# Patient Record
Sex: FEMALE | Race: WHITE | NOT HISPANIC OR LATINO
[De-identification: names, ages, dates, MRNs, and addresses within clinical notes are randomized per-mention and may not be internally consistent; named-entity substitution may affect disease eponyms.]

---

## 2020-09-15 PROBLEM — Z00.00 ENCOUNTER FOR PREVENTIVE HEALTH EXAMINATION: Status: ACTIVE | Noted: 2020-09-15

## 2020-09-16 ENCOUNTER — ASOB RESULT (OUTPATIENT)
Age: 25
End: 2020-09-16

## 2020-09-16 ENCOUNTER — APPOINTMENT (OUTPATIENT)
Dept: ANTEPARTUM | Facility: CLINIC | Age: 25
End: 2020-09-16
Payer: COMMERCIAL

## 2020-09-16 PROCEDURE — 76817 TRANSVAGINAL US OBSTETRIC: CPT | Mod: 59

## 2020-09-16 PROCEDURE — 76811 OB US DETAILED SNGL FETUS: CPT

## 2021-01-29 ENCOUNTER — OUTPATIENT (OUTPATIENT)
Dept: OUTPATIENT SERVICES | Facility: HOSPITAL | Age: 26
LOS: 1 days | End: 2021-01-29
Payer: COMMERCIAL

## 2021-01-29 DIAGNOSIS — Z11.52 ENCOUNTER FOR SCREENING FOR COVID-19: ICD-10-CM

## 2021-01-29 LAB — SARS-COV-2 RNA SPEC QL NAA+PROBE: SIGNIFICANT CHANGE UP

## 2021-01-29 PROCEDURE — U0005: CPT

## 2021-01-29 PROCEDURE — U0003: CPT

## 2021-01-29 PROCEDURE — C9803: CPT

## 2021-01-31 ENCOUNTER — TRANSCRIPTION ENCOUNTER (OUTPATIENT)
Age: 26
End: 2021-01-31

## 2021-01-31 ENCOUNTER — INPATIENT (INPATIENT)
Facility: HOSPITAL | Age: 26
LOS: 3 days | Discharge: HOME | End: 2021-02-04
Attending: OBSTETRICS & GYNECOLOGY | Admitting: OBSTETRICS & GYNECOLOGY
Payer: COMMERCIAL

## 2021-01-31 VITALS — HEART RATE: 87 BPM | OXYGEN SATURATION: 99 % | TEMPERATURE: 99 F

## 2021-01-31 DIAGNOSIS — Z33.1 PREGNANT STATE, INCIDENTAL: ICD-10-CM

## 2021-01-31 LAB
BASOPHILS # BLD AUTO: 0.02 K/UL — SIGNIFICANT CHANGE UP (ref 0–0.2)
BASOPHILS NFR BLD AUTO: 0.2 % — SIGNIFICANT CHANGE UP (ref 0–2)
BLD GP AB SCN SERPL QL: NEGATIVE — SIGNIFICANT CHANGE UP
EOSINOPHIL # BLD AUTO: 0.12 K/UL — SIGNIFICANT CHANGE UP (ref 0–0.5)
EOSINOPHIL NFR BLD AUTO: 1.2 % — SIGNIFICANT CHANGE UP (ref 0–6)
HCT VFR BLD CALC: 40.4 % — SIGNIFICANT CHANGE UP (ref 34.5–45)
HGB BLD-MCNC: 13.6 G/DL — SIGNIFICANT CHANGE UP (ref 11.5–15.5)
IMM GRANULOCYTES NFR BLD AUTO: 0.7 % — SIGNIFICANT CHANGE UP (ref 0–1.5)
LYMPHOCYTES # BLD AUTO: 1.96 K/UL — SIGNIFICANT CHANGE UP (ref 1–3.3)
LYMPHOCYTES # BLD AUTO: 19.4 % — SIGNIFICANT CHANGE UP (ref 13–44)
MCHC RBC-ENTMCNC: 29.2 PG — SIGNIFICANT CHANGE UP (ref 27–34)
MCHC RBC-ENTMCNC: 33.7 GM/DL — SIGNIFICANT CHANGE UP (ref 32–36)
MCV RBC AUTO: 86.7 FL — SIGNIFICANT CHANGE UP (ref 80–100)
MONOCYTES # BLD AUTO: 1.22 K/UL — HIGH (ref 0–0.9)
MONOCYTES NFR BLD AUTO: 12.1 % — SIGNIFICANT CHANGE UP (ref 2–14)
NEUTROPHILS # BLD AUTO: 6.72 K/UL — SIGNIFICANT CHANGE UP (ref 1.8–7.4)
NEUTROPHILS NFR BLD AUTO: 66.4 % — SIGNIFICANT CHANGE UP (ref 43–77)
NRBC # BLD: 0 /100 WBCS — SIGNIFICANT CHANGE UP (ref 0–0)
PLATELET # BLD AUTO: 140 K/UL — LOW (ref 150–400)
RBC # BLD: 4.66 M/UL — SIGNIFICANT CHANGE UP (ref 3.8–5.2)
RBC # FLD: 14.4 % — SIGNIFICANT CHANGE UP (ref 10.3–14.5)
RH IG SCN BLD-IMP: POSITIVE — SIGNIFICANT CHANGE UP
RH IG SCN BLD-IMP: POSITIVE — SIGNIFICANT CHANGE UP
WBC # BLD: 10.11 K/UL — SIGNIFICANT CHANGE UP (ref 3.8–10.5)
WBC # FLD AUTO: 10.11 K/UL — SIGNIFICANT CHANGE UP (ref 3.8–10.5)

## 2021-01-31 RX ORDER — SODIUM CHLORIDE 9 MG/ML
1000 INJECTION, SOLUTION INTRAVENOUS
Refills: 0 | Status: DISCONTINUED | OUTPATIENT
Start: 2021-01-31 | End: 2021-02-02

## 2021-01-31 RX ORDER — CITRIC ACID/SODIUM CITRATE 300-500 MG
15 SOLUTION, ORAL ORAL EVERY 6 HOURS
Refills: 0 | Status: DISCONTINUED | OUTPATIENT
Start: 2021-01-31 | End: 2021-02-02

## 2021-01-31 RX ORDER — OXYTOCIN 10 UNIT/ML
333.33 VIAL (ML) INJECTION
Qty: 20 | Refills: 0 | Status: DISCONTINUED | OUTPATIENT
Start: 2021-01-31 | End: 2021-02-04

## 2021-01-31 RX ADMIN — SODIUM CHLORIDE 125 MILLILITER(S): 9 INJECTION, SOLUTION INTRAVENOUS at 19:20

## 2021-01-31 RX ADMIN — SODIUM CHLORIDE 125 MILLILITER(S): 9 INJECTION, SOLUTION INTRAVENOUS at 19:24

## 2021-01-31 NOTE — OB PROVIDER H&P - HISTORY OF PRESENT ILLNESS
R1 H&P    Pt is a 24 y/o  at 40w admitted for scheduled elective IOL.   Patient denies contractions, vaginal bleeding, LOF, FM felt.   Prenatal course uncomplicated  GBS negative  EFW    OBHx: G1 current  GynHx: denies  PMHx: denies  PSHx:denies  Med: denies  All: NKDA  SH: denies t/a/d  PsychHx: denies hx of anxiety or depression    VS:   Vital Signs Last 24 Hrs  T(C): 37 (2021 18:55), Max: 37.0 (2021 18:43)  T(F): 98.6 (2021 18:55), Max: 98.6 (2021 18:43)  HR: 72 (2021 19:08) (72 - 96)  BP: 119/75 (2021 18:55) (119/75 - 119/75)  BP(mean): --  RR: 18 (2021 18:55) (18 - 18)  SpO2: 97% (2021 19:08) (97% - 99%)  GA: NAD  Cards: RRR  Pulm: CTAB  EFH: baseline 145 ,moderate variability, -accels, -decels   [place]: flat  VE: 2.5/60/-2  TAUS: vertex

## 2021-01-31 NOTE — OB RN PATIENT PROFILE - HBSAG: DATE, OB PROFILE
Anesthesia Type: 1% lidocaine with epinephrine and a 1:10 solution of 8.4% sodium bicarbonate 11-Jun-2020

## 2021-01-31 NOTE — OB RN PATIENT PROFILE - INTERNATIONAL TRAVEL
[FreeTextEntry1] : Mr. 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 Jeanette Navarro. No

## 2021-01-31 NOTE — OB PROVIDER H&P - ASSESSMENT
A&P:     24 y/o  at 40w admitted for eIOL.     Labor: admit for scheduled eIOL  -routine labs  -EFM/toco  -NPO, IV hydration  -induction with PO cytotec    Fetal: cat 1 tracing, fetal status reassuring  GBS: neg      plan per Dr. Leo Barba  PGY-1

## 2021-01-31 NOTE — OB RN PATIENT PROFILE - ALERT: PERTINENT HISTORY
Fetal Sonogram/1st Trimester Sonogram/20 Week Level II Sonogram/Ultra Screen at 12 Weeks Fetal Sonogram/1st Trimester Sonogram/20 Week Level II Sonogram/Fetal Non-Stress Test (NST)/Ultra Screen at 12 Weeks

## 2021-02-01 LAB
SARS-COV-2 IGG SERPL QL IA: NEGATIVE — SIGNIFICANT CHANGE UP
SARS-COV-2 IGM SERPL IA-ACNC: <0.1 INDEX — SIGNIFICANT CHANGE UP

## 2021-02-01 RX ORDER — OXYTOCIN 10 UNIT/ML
4 VIAL (ML) INJECTION
Qty: 30 | Refills: 0 | Status: DISCONTINUED | OUTPATIENT
Start: 2021-02-01 | End: 2021-02-04

## 2021-02-01 RX ORDER — NALOXONE HYDROCHLORIDE 4 MG/.1ML
0.1 SPRAY NASAL
Refills: 0 | Status: DISCONTINUED | OUTPATIENT
Start: 2021-02-01 | End: 2021-02-04

## 2021-02-01 RX ORDER — OXYCODONE HYDROCHLORIDE 5 MG/1
5 TABLET ORAL ONCE
Refills: 0 | Status: DISCONTINUED | OUTPATIENT
Start: 2021-02-01 | End: 2021-02-01

## 2021-02-01 RX ORDER — OXYCODONE HYDROCHLORIDE 5 MG/1
5 TABLET ORAL
Refills: 0 | Status: COMPLETED | OUTPATIENT
Start: 2021-02-02 | End: 2021-02-09

## 2021-02-01 RX ORDER — DEXAMETHASONE 0.5 MG/5ML
4 ELIXIR ORAL EVERY 6 HOURS
Refills: 0 | Status: DISCONTINUED | OUTPATIENT
Start: 2021-02-01 | End: 2021-02-02

## 2021-02-01 RX ORDER — OXYTOCIN 10 UNIT/ML
333.33 VIAL (ML) INJECTION
Qty: 20 | Refills: 0 | Status: DISCONTINUED | OUTPATIENT
Start: 2021-02-02 | End: 2021-02-04

## 2021-02-01 RX ORDER — MAGNESIUM HYDROXIDE 400 MG/1
30 TABLET, CHEWABLE ORAL
Refills: 0 | Status: DISCONTINUED | OUTPATIENT
Start: 2021-02-02 | End: 2021-02-04

## 2021-02-01 RX ORDER — SODIUM CHLORIDE 9 MG/ML
1000 INJECTION, SOLUTION INTRAVENOUS
Refills: 0 | Status: DISCONTINUED | OUTPATIENT
Start: 2021-02-02 | End: 2021-02-04

## 2021-02-01 RX ORDER — MORPHINE SULFATE 50 MG/1
2 CAPSULE, EXTENDED RELEASE ORAL ONCE
Refills: 0 | Status: DISCONTINUED | OUTPATIENT
Start: 2021-02-01 | End: 2021-02-02

## 2021-02-01 RX ORDER — ACETAMINOPHEN 500 MG
975 TABLET ORAL
Refills: 0 | Status: DISCONTINUED | OUTPATIENT
Start: 2021-02-02 | End: 2021-02-04

## 2021-02-01 RX ORDER — KETOROLAC TROMETHAMINE 30 MG/ML
30 SYRINGE (ML) INJECTION EVERY 6 HOURS
Refills: 0 | Status: DISCONTINUED | OUTPATIENT
Start: 2021-02-02 | End: 2021-02-02

## 2021-02-01 RX ORDER — DIPHENHYDRAMINE HCL 50 MG
25 CAPSULE ORAL EVERY 6 HOURS
Refills: 0 | Status: COMPLETED | OUTPATIENT
Start: 2021-02-02 | End: 2022-01-01

## 2021-02-01 RX ORDER — NALOXONE HYDROCHLORIDE 4 MG/.1ML
0.1 SPRAY NASAL
Refills: 0 | Status: DISCONTINUED | OUTPATIENT
Start: 2021-02-01 | End: 2021-02-02

## 2021-02-01 RX ORDER — HEPARIN SODIUM 5000 [USP'U]/ML
5000 INJECTION INTRAVENOUS; SUBCUTANEOUS EVERY 12 HOURS
Refills: 0 | Status: DISCONTINUED | OUTPATIENT
Start: 2021-02-02 | End: 2021-02-04

## 2021-02-01 RX ORDER — ONDANSETRON 8 MG/1
4 TABLET, FILM COATED ORAL EVERY 6 HOURS
Refills: 0 | Status: DISCONTINUED | OUTPATIENT
Start: 2021-02-01 | End: 2021-02-04

## 2021-02-01 RX ORDER — METOCLOPRAMIDE HCL 10 MG
10 TABLET ORAL ONCE
Refills: 0 | Status: DISCONTINUED | OUTPATIENT
Start: 2021-02-01 | End: 2021-02-04

## 2021-02-01 RX ORDER — ONDANSETRON 8 MG/1
4 TABLET, FILM COATED ORAL EVERY 6 HOURS
Refills: 0 | Status: DISCONTINUED | OUTPATIENT
Start: 2021-02-01 | End: 2021-02-02

## 2021-02-01 RX ORDER — FAMOTIDINE 10 MG/ML
20 INJECTION INTRAVENOUS ONCE
Refills: 0 | Status: DISCONTINUED | OUTPATIENT
Start: 2021-02-01 | End: 2021-02-04

## 2021-02-01 RX ORDER — CITRIC ACID/SODIUM CITRATE 300-500 MG
15 SOLUTION, ORAL ORAL ONCE
Refills: 0 | Status: DISCONTINUED | OUTPATIENT
Start: 2021-02-01 | End: 2021-02-04

## 2021-02-01 RX ORDER — SIMETHICONE 80 MG/1
80 TABLET, CHEWABLE ORAL EVERY 4 HOURS
Refills: 0 | Status: DISCONTINUED | OUTPATIENT
Start: 2021-02-02 | End: 2021-02-04

## 2021-02-01 RX ORDER — OXYCODONE HYDROCHLORIDE 5 MG/1
5 TABLET ORAL ONCE
Refills: 0 | Status: DISCONTINUED | OUTPATIENT
Start: 2021-02-02 | End: 2021-02-04

## 2021-02-01 RX ORDER — IBUPROFEN 200 MG
600 TABLET ORAL EVERY 6 HOURS
Refills: 0 | Status: COMPLETED | OUTPATIENT
Start: 2021-02-02 | End: 2022-01-01

## 2021-02-01 RX ORDER — TETANUS TOXOID, REDUCED DIPHTHERIA TOXOID AND ACELLULAR PERTUSSIS VACCINE, ADSORBED 5; 2.5; 8; 8; 2.5 [IU]/.5ML; [IU]/.5ML; UG/.5ML; UG/.5ML; UG/.5ML
0.5 SUSPENSION INTRAMUSCULAR ONCE
Refills: 0 | Status: DISCONTINUED | OUTPATIENT
Start: 2021-02-02 | End: 2021-02-04

## 2021-02-01 RX ORDER — LANOLIN
1 OINTMENT (GRAM) TOPICAL EVERY 6 HOURS
Refills: 0 | Status: DISCONTINUED | OUTPATIENT
Start: 2021-02-02 | End: 2021-02-04

## 2021-02-01 RX ORDER — DEXAMETHASONE 0.5 MG/5ML
4 ELIXIR ORAL EVERY 6 HOURS
Refills: 0 | Status: DISCONTINUED | OUTPATIENT
Start: 2021-02-01 | End: 2021-02-04

## 2021-02-01 RX ADMIN — OXYCODONE HYDROCHLORIDE 5 MILLIGRAM(S): 5 TABLET ORAL at 22:40

## 2021-02-01 RX ADMIN — SODIUM CHLORIDE 125 MILLILITER(S): 9 INJECTION, SOLUTION INTRAVENOUS at 01:18

## 2021-02-01 RX ADMIN — SODIUM CHLORIDE 125 MILLILITER(S): 9 INJECTION, SOLUTION INTRAVENOUS at 03:45

## 2021-02-01 RX ADMIN — Medication 4 MILLIUNIT(S)/MIN: at 04:17

## 2021-02-01 NOTE — OB PROVIDER LABOR PROGRESS NOTE - NS_ADDCERVICALEXAM_OBGYN_ALL_OB
Click to add…

## 2021-02-01 NOTE — OB RN DELIVERY SUMMARY - NS_TIMERUPTUREDADMITTED_OBGYN_ALL_OB_FT
Infant discharged with parents in car seat. Discharge instructions discussed and given to parents, state understanding. Hugs removed and paperwork signed. 18 Hour(s) 54 Minute(s)

## 2021-02-01 NOTE — OB RN DELIVERY SUMMARY - NS_SEPSISRSKCALC_OBGYN_ALL_OB_FT
No temperature has been documented for this patient in CPN or on the OB Flowsheet. Ensure the highest temperature during labor was documented on the OB Flowsheet.  No gestational age at birth has been documented. Ensure delivery date/time has been entered above.  Rupture of membranes must be entered above.   EOS calculated successfully. EOS Risk Factor: 0.5/1000 live births ([facility] national incidence); GA=40w1d; Temp=99.32; ROM=18.9; GBS='Negative'; Antibiotics='No antibiotics or any antibiotics < 2 hrs prior to birth'

## 2021-02-01 NOTE — OB PROVIDER DELIVERY SUMMARY - NSPROVIDERDELIVERYNOTE_OBGYN_ALL_OB_FT
Patient admitted for elective induction.  Taken for pLTCS for arrest of descent. Live male  delivery via lower uterine incision. Apgars 9/9. Weight= 4056g, cephalic presentation. Cord clamped and cut. Cord gas sent. Placenta delivered intact. Hysterotomy w/ b/l lateral extensions. Closed with caprosyn w/ second overlapping closure w/ caprosyn.  Interseed placed over uterus.  Rectus reapproximated with 2-0 vicryl. Fascia closed vicryl. Skin closed with monocryl. EBL 1000 IVF 2000 mL, UOP 200cc.

## 2021-02-01 NOTE — OB PROVIDER LABOR PROGRESS NOTE - NS_SUBJECTIVE/OBJECTIVE_OBGYN_ALL_OB_FT
OB PA Progress Note    Pt seen and evaluated for progress. CB in place. Exam unchanged at 3/70/-3.    For pitocin augmentation

## 2021-02-01 NOTE — OB PROVIDER LABOR PROGRESS NOTE - NSVAGINALEXAM_OBGYN_ALL_OB_DT
01-Feb-2021 01:11
01-Feb-2021 09:11
01-Feb-2021 18:24
01-Feb-2021 05:31
01-Feb-2021 14:43
01-Feb-2021 04:18
01-Feb-2021 11:22
01-Feb-2021 12:57

## 2021-02-01 NOTE — OB NEONATOLOGY/PEDIATRICIAN DELIVERY SUMMARY - NSPEDSNEONOTESA_OBGYN_ALL_OB_FT
Called to delivery of baby boy born at 40.1 wks via CS for arrest of descent to a 24y/o  O+ blood type mother. No significant maternal or prenatal history. PNL nr/immune/-, GBS - on . ROM at 905 with clear fluids. Baby emerged vigorous, crying, was w/d/s/s with APGARS of 9/9. Mom would like to breastfeed, consents Hep B and declines circ. EOS 0.26. Mother COVID status negative. In house pediatrician Dr. Clements.

## 2021-02-02 LAB
BASOPHILS # BLD AUTO: 0 K/UL — SIGNIFICANT CHANGE UP (ref 0–0.2)
BASOPHILS NFR BLD AUTO: 0 % — SIGNIFICANT CHANGE UP (ref 0–2)
EOSINOPHIL # BLD AUTO: 0 K/UL — SIGNIFICANT CHANGE UP (ref 0–0.5)
EOSINOPHIL NFR BLD AUTO: 0 % — SIGNIFICANT CHANGE UP (ref 0–6)
HCT VFR BLD CALC: 27.1 % — LOW (ref 34.5–45)
HGB BLD-MCNC: 9.1 G/DL — LOW (ref 11.5–15.5)
LYMPHOCYTES # BLD AUTO: 1.12 K/UL — SIGNIFICANT CHANGE UP (ref 1–3.3)
LYMPHOCYTES # BLD AUTO: 4.3 % — LOW (ref 13–44)
MCHC RBC-ENTMCNC: 29.9 PG — SIGNIFICANT CHANGE UP (ref 27–34)
MCHC RBC-ENTMCNC: 33.6 GM/DL — SIGNIFICANT CHANGE UP (ref 32–36)
MCV RBC AUTO: 89.1 FL — SIGNIFICANT CHANGE UP (ref 80–100)
MONOCYTES # BLD AUTO: 0.91 K/UL — HIGH (ref 0–0.9)
MONOCYTES NFR BLD AUTO: 3.5 % — SIGNIFICANT CHANGE UP (ref 2–14)
NEUTROPHILS # BLD AUTO: 23.93 K/UL — HIGH (ref 1.8–7.4)
NEUTROPHILS NFR BLD AUTO: 88.7 % — HIGH (ref 43–77)
PLATELET # BLD AUTO: 128 K/UL — LOW (ref 150–400)
RBC # BLD: 3.04 M/UL — LOW (ref 3.8–5.2)
RBC # FLD: 14.4 % — SIGNIFICANT CHANGE UP (ref 10.3–14.5)
T PALLIDUM AB TITR SER: NEGATIVE — SIGNIFICANT CHANGE UP
WBC # BLD: 25.95 K/UL — HIGH (ref 3.8–10.5)
WBC # FLD AUTO: 25.95 K/UL — HIGH (ref 3.8–10.5)

## 2021-02-02 RX ORDER — IBUPROFEN 200 MG
600 TABLET ORAL EVERY 6 HOURS
Refills: 0 | Status: DISCONTINUED | OUTPATIENT
Start: 2021-02-02 | End: 2021-02-04

## 2021-02-02 RX ORDER — OXYCODONE HYDROCHLORIDE 5 MG/1
5 TABLET ORAL
Refills: 0 | Status: DISCONTINUED | OUTPATIENT
Start: 2021-02-02 | End: 2021-02-04

## 2021-02-02 RX ORDER — FERROUS SULFATE 325(65) MG
325 TABLET ORAL THREE TIMES A DAY
Refills: 0 | Status: DISCONTINUED | OUTPATIENT
Start: 2021-02-02 | End: 2021-02-04

## 2021-02-02 RX ORDER — ASCORBIC ACID 60 MG
500 TABLET,CHEWABLE ORAL THREE TIMES A DAY
Refills: 0 | Status: DISCONTINUED | OUTPATIENT
Start: 2021-02-02 | End: 2021-02-04

## 2021-02-02 RX ORDER — DIPHENHYDRAMINE HCL 50 MG
25 CAPSULE ORAL EVERY 6 HOURS
Refills: 0 | Status: DISCONTINUED | OUTPATIENT
Start: 2021-02-02 | End: 2021-02-04

## 2021-02-02 RX ORDER — OXYCODONE HYDROCHLORIDE 5 MG/1
5 TABLET ORAL ONCE
Refills: 0 | Status: DISCONTINUED | OUTPATIENT
Start: 2021-02-02 | End: 2021-02-02

## 2021-02-02 RX ADMIN — HEPARIN SODIUM 5000 UNIT(S): 5000 INJECTION INTRAVENOUS; SUBCUTANEOUS at 05:57

## 2021-02-02 RX ADMIN — Medication 30 MILLIGRAM(S): at 01:56

## 2021-02-02 RX ADMIN — Medication 975 MILLIGRAM(S): at 15:10

## 2021-02-02 RX ADMIN — HEPARIN SODIUM 5000 UNIT(S): 5000 INJECTION INTRAVENOUS; SUBCUTANEOUS at 18:02

## 2021-02-02 RX ADMIN — Medication 975 MILLIGRAM(S): at 21:11

## 2021-02-02 RX ADMIN — OXYCODONE HYDROCHLORIDE 5 MILLIGRAM(S): 5 TABLET ORAL at 22:39

## 2021-02-02 RX ADMIN — Medication 30 MILLIGRAM(S): at 05:57

## 2021-02-02 RX ADMIN — Medication 30 MILLIGRAM(S): at 18:02

## 2021-02-02 RX ADMIN — Medication 25 MILLIGRAM(S): at 04:09

## 2021-02-02 RX ADMIN — Medication 500 MILLIGRAM(S): at 15:09

## 2021-02-02 RX ADMIN — Medication 30 MILLIGRAM(S): at 12:08

## 2021-02-02 RX ADMIN — Medication 325 MILLIGRAM(S): at 15:09

## 2021-02-02 RX ADMIN — Medication 975 MILLIGRAM(S): at 03:14

## 2021-02-02 RX ADMIN — Medication 325 MILLIGRAM(S): at 21:10

## 2021-02-02 RX ADMIN — OXYCODONE HYDROCHLORIDE 5 MILLIGRAM(S): 5 TABLET ORAL at 07:48

## 2021-02-02 RX ADMIN — SIMETHICONE 80 MILLIGRAM(S): 80 TABLET, CHEWABLE ORAL at 21:11

## 2021-02-02 RX ADMIN — Medication 975 MILLIGRAM(S): at 08:37

## 2021-02-02 RX ADMIN — Medication 500 MILLIGRAM(S): at 21:10

## 2021-02-02 NOTE — PROGRESS NOTE ADULT - SUBJECTIVE AND OBJECTIVE BOX
Day 1 of Anesthesia Pain Management Service    SUBJECTIVE:  Pain Scale Score:          [X] Refer to charted pain scores    THERAPY: Received PF epidural  morphine as above    OBJECTIVE:    Sedation:        	[X] Alert	[ ] Drowsy	[ ] Arousable      [ ] Asleep       [ ] Unresponsive    Side Effects:	[X] None	[ ] Nausea	[ ] Vomiting         [ ] Pruritus  		[ ] Weakness            [ ] Numbness	          [ ] Other:    ASSESSMENT/ PLAN  [X] Patient transitioned to prn analgesics  [X] Pain management per primary service, pain service to sign off   [X]Documentation and Verification of current medications

## 2021-02-02 NOTE — CHART NOTE - NSCHARTNOTEFT_GEN_A_CORE
PA NOTE     POD#1         Vital Signs Last 24 Hrs  T(C): 36.6 (2021 05:53), Max: 37.4 (2021 15:01)  T(F): 97.9 (2021 05:53), Max: 99.32 (2021 15:01)  HR: 86 (2021 23:52) (54 - 99)  BP: 118/71 (2021 23:52) (106/55 - 139/62)  BP(mean): 85 (2021 22:10) (73 - 89)  RR: 18 (2021 05:53) (16 - 26)  SpO2: 98% (2021 05:53) (92% - 100%)               9.1    25.95 )-----------( 128      (  @ 06:00 )             27.1                13.6   10.11 )-----------( 140      (  @ 19:45 )             40.4           Assessment: Anemia secondary to acute blood loss due to delivery    Plan:  - Ferrous Sulfate, Vitamin C supplementation.  - Monitor for signs/symptoms of anemia.   - Does not require transfusion at this time

## 2021-02-02 NOTE — PROGRESS NOTE ADULT - SUBJECTIVE AND OBJECTIVE BOX
Day 1 of Anesthesia Pain Management Service    SUBJECTIVE: Doing ok  Pain Scale Score:          [X] Refer to charted pain scores    THERAPY:  s/p   2  mg PF epidural morphine on 2\1\2021      MEDICATIONS  (STANDING):  acetaminophen   Tablet .. 975 milliGRAM(s) Oral <User Schedule>  ascorbic acid 500 milliGRAM(s) Oral three times a day  citric acid/sodium citrate Solution 15 milliLiter(s) Oral once  diphtheria/tetanus/pertussis (acellular) Vaccine (ADAcel) 0.5 milliLiter(s) IntraMuscular once  famotidine Injectable 20 milliGRAM(s) IV Push once  ferrous    sulfate 325 milliGRAM(s) Oral three times a day  heparin   Injectable 5000 Unit(s) SubCutaneous every 12 hours  ibuprofen  Tablet. 600 milliGRAM(s) Oral every 6 hours  ketorolac   Injectable 30 milliGRAM(s) IV Push every 6 hours  lactated ringers. 1000 milliLiter(s) (125 mL/Hr) IV Continuous <Continuous>  metoclopramide Injectable 10 milliGRAM(s) IV Push once  morphine PF Epidural 2 milliGRAM(s) Epidural once  oxytocin Infusion 333.333 milliUNIT(s)/Min (1000 mL/Hr) IV Continuous <Continuous>  oxytocin Infusion 4 milliUNIT(s)/Min (4 mL/Hr) IV Continuous <Continuous>  oxytocin Infusion 333.333 milliUNIT(s)/Min (1000 mL/Hr) IV Continuous <Continuous>    MEDICATIONS  (PRN):  dexAMETHasone  Injectable 4 milliGRAM(s) IV Push every 6 hours PRN Nausea  dexAMETHasone  Injectable 4 milliGRAM(s) IV Push every 6 hours PRN Nausea  diphenhydrAMINE 25 milliGRAM(s) Oral every 6 hours PRN Pruritus  lanolin Ointment 1 Application(s) Topical every 6 hours PRN Sore Nipples  magnesium hydroxide Suspension 30 milliLiter(s) Oral two times a day PRN Constipation  naloxone Injectable 0.1 milliGRAM(s) IV Push every 3 minutes PRN For ANY of the following changes in patient status:  A. Breaths Per Minute LESS THAN 10, B. Oxygen saturation LESS THAN 90%, C. Sedation score of 6 for Stop After: 4 Times  naloxone Injectable 0.1 milliGRAM(s) IV Push every 3 minutes PRN For ANY of the following changes in patient status:  A. Breaths Per Minute LESS THAN 10, B. Oxygen saturation LESS THAN 90%, C. Sedation score of 6 for Stop After: 4 Times  ondansetron Injectable 4 milliGRAM(s) IV Push every 6 hours PRN Nausea  ondansetron Injectable 4 milliGRAM(s) IV Push every 6 hours PRN Nausea  oxyCODONE    IR 5 milliGRAM(s) Oral every 3 hours PRN Moderate to Severe Pain (4-10)  oxyCODONE    IR 5 milliGRAM(s) Oral once PRN Moderate to Severe Pain (4-10)  simethicone 80 milliGRAM(s) Chew every 4 hours PRN Gas      OBJECTIVE:    Sedation:        	[X] Alert	 [ ] Drowsy	[ ] Arousable      [ ] Asleep       [ ] Unresponsive    Side Effects:	[ ] None 	[ ] Nausea	[ ] Vomiting         [ X] Pruritus: benadryl prn  		[ ] Weakness            [ ] Numbness	          [ ] Other:    Vital Signs Last 24 Hrs  T(C): 36.6 (02 Feb 2021 05:53), Max: 37.4 (01 Feb 2021 15:01)  T(F): 97.9 (02 Feb 2021 05:53), Max: 99.32 (01 Feb 2021 15:01)  HR: 86 (01 Feb 2021 23:52) (56 - 99)  BP: 118/71 (01 Feb 2021 23:52) (106/55 - 139/62)  BP(mean): 85 (01 Feb 2021 22:10) (73 - 89)  RR: 18 (02 Feb 2021 05:53) (16 - 26)  SpO2: 98% (02 Feb 2021 05:53) (92% - 100%)    ASSESSMENT/ PLAN  [X] Patient transitioned to prn analgesics  [X] Pain management per primary service, pain service to sign off   [X]Documentation and Verification of current medications

## 2021-02-02 NOTE — PROGRESS NOTE ADULT - SUBJECTIVE AND OBJECTIVE BOX
Postpartum Note-  Section POD#1    Allergies    No Known Allergies    Intolerances            S:Patient is a  25y G [id number]    POD#1 S/P  primary    C/Sec  Patient w/o complaints, pain is controlled.  Pt is OOB, tolerating PO, passing flatus. Lochia WNL.     O:  Vital Signs Last 24 Hrs  T(C): 36.6 (2021 05:53), Max: 37.4 (2021 15:01)  T(F): 97.9 (2021 05:53), Max: 99.32 (2021 15:01)  HR: 86 (2021 23:52) (54 - 99)  BP: 118/71 (2021 23:52) (106/55 - 139/62)  BP(mean): 85 (2021 22:10) (73 - 89)  RR: 18 (2021 05:53) (16 - 26)  SpO2: 98% (2021 05:53) (92% - 100%)  I&O's Summary    2021 07:01  -  2021 07:00  --------------------------------------------------------  IN: 2000 mL / OUT: 1100 mL / NET: 900 mL    2021 07:01  -  2021 06:56  --------------------------------------------------------  IN: 1000 mL / OUT: 2900 mL / NET: -1900 mL        Gen: NAD  Abdomen: Soft, appropriately tender, non-distended, fundus firm.  Incision: Clean, dry, and intact.  Negative erythema/edema/ecchymosis   Sub Q-dermabond  Lochia WNL  Ext: neg edema. Negative Homans B/L    LABS:                          13.6   10.11 )-----------( 140      ( 2021 19:45 )             40.4

## 2021-02-03 LAB
BASOPHILS # BLD AUTO: 0.03 K/UL — SIGNIFICANT CHANGE UP (ref 0–0.2)
BASOPHILS NFR BLD AUTO: 0.2 % — SIGNIFICANT CHANGE UP (ref 0–2)
EOSINOPHIL # BLD AUTO: 0.22 K/UL — SIGNIFICANT CHANGE UP (ref 0–0.5)
EOSINOPHIL NFR BLD AUTO: 1.5 % — SIGNIFICANT CHANGE UP (ref 0–6)
HCT VFR BLD CALC: 23.9 % — LOW (ref 34.5–45)
HGB BLD-MCNC: 7.8 G/DL — LOW (ref 11.5–15.5)
IMM GRANULOCYTES NFR BLD AUTO: 0.9 % — SIGNIFICANT CHANGE UP (ref 0–1.5)
LYMPHOCYTES # BLD AUTO: 1.91 K/UL — SIGNIFICANT CHANGE UP (ref 1–3.3)
LYMPHOCYTES # BLD AUTO: 13 % — SIGNIFICANT CHANGE UP (ref 13–44)
MCHC RBC-ENTMCNC: 29.2 PG — SIGNIFICANT CHANGE UP (ref 27–34)
MCHC RBC-ENTMCNC: 32.6 GM/DL — SIGNIFICANT CHANGE UP (ref 32–36)
MCV RBC AUTO: 89.5 FL — SIGNIFICANT CHANGE UP (ref 80–100)
MONOCYTES # BLD AUTO: 1.12 K/UL — HIGH (ref 0–0.9)
MONOCYTES NFR BLD AUTO: 7.6 % — SIGNIFICANT CHANGE UP (ref 2–14)
NEUTROPHILS # BLD AUTO: 11.27 K/UL — HIGH (ref 1.8–7.4)
NEUTROPHILS NFR BLD AUTO: 76.8 % — SIGNIFICANT CHANGE UP (ref 43–77)
NRBC # BLD: 0 /100 WBCS — SIGNIFICANT CHANGE UP (ref 0–0)
PLATELET # BLD AUTO: 124 K/UL — LOW (ref 150–400)
RBC # BLD: 2.67 M/UL — LOW (ref 3.8–5.2)
RBC # FLD: 14.8 % — HIGH (ref 10.3–14.5)
WBC # BLD: 14.68 K/UL — HIGH (ref 3.8–10.5)
WBC # FLD AUTO: 14.68 K/UL — HIGH (ref 3.8–10.5)

## 2021-02-03 RX ADMIN — Medication 500 MILLIGRAM(S): at 21:15

## 2021-02-03 RX ADMIN — Medication 975 MILLIGRAM(S): at 09:28

## 2021-02-03 RX ADMIN — Medication 600 MILLIGRAM(S): at 00:25

## 2021-02-03 RX ADMIN — Medication 500 MILLIGRAM(S): at 15:19

## 2021-02-03 RX ADMIN — HEPARIN SODIUM 5000 UNIT(S): 5000 INJECTION INTRAVENOUS; SUBCUTANEOUS at 05:30

## 2021-02-03 RX ADMIN — Medication 975 MILLIGRAM(S): at 21:16

## 2021-02-03 RX ADMIN — Medication 600 MILLIGRAM(S): at 23:36

## 2021-02-03 RX ADMIN — HEPARIN SODIUM 5000 UNIT(S): 5000 INJECTION INTRAVENOUS; SUBCUTANEOUS at 18:10

## 2021-02-03 RX ADMIN — SIMETHICONE 80 MILLIGRAM(S): 80 TABLET, CHEWABLE ORAL at 12:46

## 2021-02-03 RX ADMIN — Medication 325 MILLIGRAM(S): at 05:30

## 2021-02-03 RX ADMIN — Medication 600 MILLIGRAM(S): at 18:10

## 2021-02-03 RX ADMIN — Medication 500 MILLIGRAM(S): at 05:31

## 2021-02-03 RX ADMIN — Medication 600 MILLIGRAM(S): at 12:42

## 2021-02-03 RX ADMIN — Medication 325 MILLIGRAM(S): at 21:15

## 2021-02-03 RX ADMIN — Medication 600 MILLIGRAM(S): at 05:31

## 2021-02-03 RX ADMIN — Medication 325 MILLIGRAM(S): at 15:19

## 2021-02-03 RX ADMIN — Medication 975 MILLIGRAM(S): at 15:19

## 2021-02-03 RX ADMIN — SIMETHICONE 80 MILLIGRAM(S): 80 TABLET, CHEWABLE ORAL at 08:00

## 2021-02-03 RX ADMIN — Medication 975 MILLIGRAM(S): at 03:24

## 2021-02-03 RX ADMIN — SIMETHICONE 80 MILLIGRAM(S): 80 TABLET, CHEWABLE ORAL at 21:15

## 2021-02-03 NOTE — PROGRESS NOTE ADULT - SUBJECTIVE AND OBJECTIVE BOX
Postpartum Note-  Section POD#2      Rubella IgG:     Immune                 RPR:               Negative        Blood Type:     O+    S:Patient is a  25y G  1  P 1   POD#2 S/P C/Sec  Subjective: Patient w/o complaints, pain is controlled.  Pt is OOB, tolerating PO, passing flatus, and voiding. Lochia WNL.   Feeding: Breastfeeding    O:  Vital Signs Last 24 Hrs  T(C): 36.5 (2021 05:00), Max: 36.7 (2021 17:00)  T(F): 97.7 (2021 05:00), Max: 98.1 (2021 17:00)  HR: 90 (2021 05:00) (70 - 92)  BP: 116/77 (2021 05:00) (103/65 - 116/77)  BP(mean): --  RR: 18 (2021 05:00) (16 - 18)  SpO2: 99% (2021 05:00) (96% - 99%)      Gen: NAD  CV: rrr s1s2, CTABL  Abdomen: Soft, nontender, non distended, fundus firm.  Bowel Sounds x 4 quadrants  Incision: Clean, dry, and intact.  Negative erythema/edema/ecchymosis.   SubQ  Lochia WNL  Ext: Neg edema, Neg calf tenderness.  Pedal pulses palpated B/L    LABS:                          7.8    14.68 )-----------( 124      ( 2021 06:12 )             23.9       A/P:  25y  POD # 2 S/P   primary  section for failure to progress, doing well    PMHx: none  Current Issues: none    Increase OOB  PO Pain Protocol  Continue Regular Diet  Continue Routine Postop/Postpartum Care           Postpartum Note-  Section POD#2      Rubella IgG:     Immune                 RPR:               Negative        Blood Type:     O+    S:Patient is a  25y G  1  P 1   POD#2 S/P C/Sec  Subjective: Patient w/o complaints, pain is controlled.  Pt is OOB, tolerating PO, passing flatus, and voiding. Lochia WNL.   Feeding: Breastfeeding    O:  Vital Signs Last 24 Hrs  T(C): 36.5 (2021 05:00), Max: 36.7 (2021 17:00)  T(F): 97.7 (2021 05:00), Max: 98.1 (2021 17:00)  HR: 90 (2021 05:00) (70 - 92)  BP: 116/77 (2021 05:00) (103/65 - 116/77)  BP(mean): --  RR: 18 (2021 05:00) (16 - 18)  SpO2: 99% (2021 05:00) (96% - 99%)      Gen: NAD  CV: rrr s1s2, CTABL  Abdomen: Soft, nontender, non distended, fundus firm.  Bowel Sounds x 4 quadrants  Incision: Clean, dry, and intact.  Negative erythema/edema/ecchymosis.   SubQ  Lochia WNL  Ext: Neg edema, Neg calf tenderness.  Pedal pulses palpated B/L    LABS:                          7.8    14.68 )-----------( 124      ( 2021 06:12 )             23.9       A/P:  25y  POD # 2 S/P   primary  section for failure to descend, doing well    PMHx: none  Current Issues: anemia secondary from acute blood loss during surgery, EBL: 1000, VSS, patient is assympomatic    Increase OOB  PO Pain Protocol  Continue Regular Diet  Continue Routine Postop/Postpartum Care

## 2021-02-03 NOTE — PROGRESS NOTE ADULT - ASSESSMENT
A/P:  25y  POD # 1 S/P   primary   section, doing well    PAST MEDICAL & SURGICAL HISTORY:  No pertinent past medical history    No significant past surgical history      Current Issues:    
25y  POD # 2 S/P   primary  section for failure to progress, doing well

## 2021-02-04 ENCOUNTER — TRANSCRIPTION ENCOUNTER (OUTPATIENT)
Age: 26
End: 2021-02-04

## 2021-02-04 VITALS
HEART RATE: 71 BPM | DIASTOLIC BLOOD PRESSURE: 81 MMHG | TEMPERATURE: 98 F | RESPIRATION RATE: 18 BRPM | SYSTOLIC BLOOD PRESSURE: 111 MMHG

## 2021-02-04 PROCEDURE — 86901 BLOOD TYPING SEROLOGIC RH(D): CPT

## 2021-02-04 PROCEDURE — 59025 FETAL NON-STRESS TEST: CPT

## 2021-02-04 PROCEDURE — C1765: CPT

## 2021-02-04 PROCEDURE — 86769 SARS-COV-2 COVID-19 ANTIBODY: CPT

## 2021-02-04 PROCEDURE — 85025 COMPLETE CBC W/AUTO DIFF WBC: CPT

## 2021-02-04 PROCEDURE — 86900 BLOOD TYPING SEROLOGIC ABO: CPT

## 2021-02-04 PROCEDURE — 59050 FETAL MONITOR W/REPORT: CPT

## 2021-02-04 PROCEDURE — 86780 TREPONEMA PALLIDUM: CPT

## 2021-02-04 PROCEDURE — 86850 RBC ANTIBODY SCREEN: CPT

## 2021-02-04 RX ADMIN — HEPARIN SODIUM 5000 UNIT(S): 5000 INJECTION INTRAVENOUS; SUBCUTANEOUS at 05:57

## 2021-02-04 RX ADMIN — Medication 975 MILLIGRAM(S): at 02:16

## 2021-02-04 RX ADMIN — SIMETHICONE 80 MILLIGRAM(S): 80 TABLET, CHEWABLE ORAL at 05:57

## 2021-02-04 RX ADMIN — Medication 600 MILLIGRAM(S): at 05:58

## 2021-02-04 RX ADMIN — Medication 325 MILLIGRAM(S): at 05:57

## 2021-02-04 RX ADMIN — Medication 975 MILLIGRAM(S): at 08:03

## 2021-02-04 RX ADMIN — Medication 500 MILLIGRAM(S): at 05:58

## 2021-02-04 NOTE — PROGRESS NOTE ADULT - SUBJECTIVE AND OBJECTIVE BOX
Postpartum Note-  Section POD#3    Allergies  No Known Allergies    Intolerances  Denies    Rubella: Immune                   RPR: Neg                        Blood Type: O+    S:Patient is a  25y  POD#3 S/P C/Sec  Subjective: Patient w/o complaints, pain is controlled.  Pt is OOB, tolerating PO, passing flatus. Lochia WNL.   Feeding: Breast feeding    O:  Vital Signs Last 24 Hrs  T(C): 36.6 (2021 05:11), Max: 36.6 (2021 09:34)  T(F): 97.8 (2021 05:11), Max: 97.9 (2021 17:10)  HR: 71 (2021 05:11) (71 - 90)  BP: 111/81 (2021 05:11) (111/81 - 116/76)  BP(mean): --  RR: 18 (2021 05:11) (17 - 18)  SpO2: 98% (2021 17:10) (98% - 98%)     Gen: NAD  CV: rrr s1s2, CTABL  Abdomen: Soft, nontender, non-distended, fundus firm.  Bowel Sounds x 4 quadrants  Incision: Clean, dry, and intact.  Negative erythema/edema/ecchymosis   Sub Q  Lochia WNL  Ext:  Neg Edema, Neg Calf tenderness. Pedal pulses palpated B/L    LABS:                          7.8    14.68 )-----------( 124      ( 2021 06:12 )             23.9       A/P:  25y  POD # 3 S/P primary   section, doing well    PMHx: None  Current Issues: Anemia    Increase OOB  Regular diet  PO Pain Protocol  Routine Postop/Postpartum care  Discharge Planning    Leilani Cavazos PA-C

## 2021-02-04 NOTE — DISCHARGE NOTE OB - CARE PROVIDER_API CALL
Pranav Barnes)  Obstetrics and Gynecology  [address] [place] [address], [address]  [place] [address]  Phone: [phone]  Fax: [phone]  Follow Up Time:

## 2021-02-04 NOTE — PROGRESS NOTE ADULT - ATTENDING COMMENTS
agree with above note  discharge  tana bolden md
agree with above note  cont present mgmt  t kimi marin

## 2021-02-04 NOTE — DISCHARGE NOTE OB - PATIENT PORTAL LINK FT
You can access the FollowMyHealth Patient Portal offered by [facility] by registering at the following website: [email or web address]. By joining [facility]’s FollowMyHealth portal, you will also be able to view your health information using other applications (apps) compatible with our system.

## 2021-05-29 NOTE — OB RN INTRAOPERATIVE NOTE - NS_SCRUBTECH_OBGYN_ALL_OB_FT
Elocon ointment for itchy rash twice a day for up to 2 weeks. Follow with all over vaseline/aquaphor. Ok for baths but pat dry and then immediately apply ointments as directed. Use fragrance free soaps and detergents. If no improvement in 1 month return for recheck.    Use butt paste for diaper rash    Use vaseline as needed to vulvar area to help with irritation. Can use warm bath soaks 1-2 times a day.     Follow up with kidney ultrasound when able.          young 1.8

## 2021-06-02 ENCOUNTER — APPOINTMENT (OUTPATIENT)
Dept: OTOLARYNGOLOGY | Facility: CLINIC | Age: 26
End: 2021-06-02
Payer: COMMERCIAL

## 2021-06-02 VITALS
HEIGHT: 61 IN | DIASTOLIC BLOOD PRESSURE: 73 MMHG | TEMPERATURE: 97.3 F | OXYGEN SATURATION: 98 % | SYSTOLIC BLOOD PRESSURE: 110 MMHG | WEIGHT: 145 LBS | HEART RATE: 66 BPM | BODY MASS INDEX: 27.38 KG/M2

## 2021-06-02 DIAGNOSIS — Z78.9 OTHER SPECIFIED HEALTH STATUS: ICD-10-CM

## 2021-06-02 DIAGNOSIS — D18.01 HEMANGIOMA OF SKIN AND SUBCUTANEOUS TISSUE: ICD-10-CM

## 2021-06-02 DIAGNOSIS — Z82.49 FAMILY HISTORY OF ISCHEMIC HEART DISEASE AND OTHER DISEASES OF THE CIRCULATORY SYSTEM: ICD-10-CM

## 2021-06-02 DIAGNOSIS — Z83.438 FAMILY HISTORY OF OTHER DISORDER OF LIPOPROTEIN METABOLISM AND OTHER LIPIDEMIA: ICD-10-CM

## 2021-06-02 DIAGNOSIS — Z84.0 FAMILY HISTORY OF DISEASES OF THE SKIN AND SUBCUTANEOUS TISSUE: ICD-10-CM

## 2021-06-02 PROCEDURE — 99203 OFFICE O/P NEW LOW 30 MIN: CPT

## 2021-06-02 PROCEDURE — 99072 ADDL SUPL MATRL&STAF TM PHE: CPT

## 2021-06-03 PROBLEM — Z82.49 FAMILY HISTORY OF HYPERTENSION: Status: ACTIVE | Noted: 2021-06-03

## 2021-06-03 PROBLEM — Z83.438 FAMILY HISTORY OF HYPERLIPIDEMIA: Status: ACTIVE | Noted: 2021-06-03

## 2021-06-03 PROBLEM — Z78.9 NO PERTINENT PAST MEDICAL HISTORY: Status: RESOLVED | Noted: 2021-06-02 | Resolved: 2021-06-03

## 2021-06-03 PROBLEM — Z84.0 FAMILY HISTORY OF PSORIASIS: Status: ACTIVE | Noted: 2021-06-03

## 2021-06-03 RX ORDER — NORETHINDRONE ACETATE AND ETHINYL ESTRADIOL, AND FERROUS FUMARATE 1MG-20(24)
1-20 KIT ORAL
Qty: 84 | Refills: 0 | Status: ACTIVE | COMMUNITY
Start: 2021-05-11

## 2021-06-03 NOTE — PHYSICAL EXAM
[Normal] : orientation to person, place, and time: normal [de-identified] : right soft lump that protrudes when pt. is biting down, over the right mandible angle. no apparent skin/mucosal involvement.

## 2021-06-03 NOTE — ASSESSMENT
[FreeTextEntry1] : 26 yo F, p/w a known right face lump over the angle of the mandible, she reports previous [facility] workup diagnosed it as an hemangioma.\par We thoroughly discussed the diagnosis with the patient and explained the required workup, f/u and treatment options.\par \par Plan\par - obtain [facility] records. \par - facial MRI. \par - referred to Opal Lozano/JUDY.\par - RTC sooner should any worrisome symptoms develop.\par - Pt verbalized understanding of above\par

## 2021-06-03 NOTE — HISTORY OF PRESENT ILLNESS
[de-identified] : 24yo F, previously seen by Dr. Turner in [facility] in her childhood for right mandible angle lump- m/p Hemangioma. \par Pt. reports she was never treated for this finding. She has not followed anyone else or done any recent imaging.  She recently gave birth, states it bothers her cosmetically. The lump is prominent when she bites down. She returns to see if there are any new treatments.\par \par She denies breathing/swallowing problems. she denies bleeding, she denies palpitations. \par Pt. requested all her medical hx from [facility].

## 2021-06-03 NOTE — REASON FOR VISIT
[Initial Evaluation] : an initial evaluation for [Family Member] : family member [FreeTextEntry2] : right face lump

## 2021-06-07 PROBLEM — Z78.9 OTHER SPECIFIED HEALTH STATUS: Chronic | Status: ACTIVE | Noted: 2021-01-31

## 2021-06-18 NOTE — DISCHARGE NOTE OB - CAREGIVER NAME
Patient Instructions by Leslie Agrawal MD at 10/26/2020 10:00 AM     Author: Leslie Agrawal MD Service: -- Author Type: Physician    Filed: 10/26/2020 10:23 AM Encounter Date: 10/26/2020 Status: Signed    : Leslie Agrawal MD (Physician)         10/26/2020  Wt Readings from Last 1 Encounters:   01/30/20 25 lb 5 oz (11.5 kg) (83 %, Z= 0.95)*     * Growth percentiles are based on WHO (Boys, 0-2 years) data.       Acetaminophen Dosing Instructions  (May take every 4-6 hours)      WEIGHT   AGE Infant/Children's  160mg/5ml Children's   Chewable Tabs  80 mg each Baudilio Strength  Chewable Tabs  160 mg     Milliliter (ml) Soft Chew Tabs Chewable Tabs   6-11 lbs 0-3 months 1.25 ml     12-17 lbs 4-11 months 2.5 ml     18-23 lbs 12-23 months 3.75 ml     24-35 lbs 2-3 years 5 ml 2 tabs    36-47 lbs 4-5 years 7.5 ml 3 tabs    48-59 lbs 6-8 years 10 ml 4 tabs 2 tabs   60-71 lbs 9-10 years 12.5 ml 5 tabs 2.5 tabs   72-95 lbs 11 years 15 ml 6 tabs 3 tabs   96 lbs and over 12 years   4 tabs     Ibuprofen Dosing Instructions- Liquid  (May take every 6-8 hours)      WEIGHT   AGE Concentrated Drops   50 mg/1.25 ml Infant/Children's   100 mg/5ml     Dropperful Milliliter (ml)   12-17 lbs 6- 11 months 1 (1.25 ml)    18-23 lbs 12-23 months 1 1/2 (1.875 ml)    24-35 lbs 2-3 years  5 ml   36-47 lbs 4-5 years  7.5 ml   48-59 lbs 6-8 years  10 ml   60-71 lbs 9-10 years  12.5 ml   72-95 lbs 11 years  15 ml       Ibuprofen Dosing Instructions- Tablets/Caplets  (May take every 6-8 hours)    WEIGHT AGE Children's   Chewable Tabs   50 mg Baudilio Strength   Chewable Tabs   100 mg Baudilio Strength   Caplets    100 mg     Tablet Tablet Caplet   24-35 lbs 2-3 years 2 tabs     36-47 lbs 4-5 years 3 tabs     48-59 lbs 6-8 years 4 tabs 2 tabs 2 caps   60-71 lbs 9-10 years 5 tabs 2.5 tabs 2.5 caps   72-95 lbs 11 years 6 tabs 3 tabs 3 caps          Patient Education      BRIGHT FUTURES HANDOUT- PARENT  2 YEAR VISIT  Here are some  suggestions from [employer] experts that may be of value to your family.     HOW YOUR FAMILY IS DOING  Take time for yourself and your partner.  Stay in touch with friends.  Make time for family activities. Spend time with each child.  Teach your child not to hit, bite, or hurt other people. Be a role model.  If you feel unsafe in your home or have been hurt by someone, let us know. Hotlines and community resources can also provide confidential help.  Dont smoke or use e-cigarettes. Keep your home and car smoke-free. Tobacco-free spaces keep children healthy.  Dont use alcohol or drugs.  Accept help from family and friends.  If you are worried about your living or food situation, reach out for help. Community agencies and programs such as WIC and SNAP can provide information and assistance.    YOUR RUPINDER BEHAVIOR  Praise your child when he does what you ask him to do.  Listen to and respect your child. Expect others to as well.  Help your child talk about his feelings.  Watch how he responds to new people or situations.  Read, talk, sing, and explore together. These activities are the best ways to help toddlers learn.  Limit TV, tablet, or smartphone use to no more than 1 hour of high-quality programs each day.  It is better for toddlers to play than to watch TV.  Encourage your child to play for up to 60 minutes a day.  Avoid TV during meals. Talk together instead.    TALKING AND YOUR CHILD  Use clear, simple language with your child. Dont use baby talk.  Talk slowly and remember that it may take a while for your child to respond. Your child should be able to follow simple instructions.  Read to your child every day. Your child may love hearing the same story over and over.  Talk about and describe pictures in books.  Talk about the things you see and hear when you are together.  Ask your child to point to things as you read.  Stop a story to let your child make an animal sound or finish a part of the  story.    TOILET TRAINING  Begin toilet training when your child is ready. Signs of being ready for toilet training include  Staying dry for 2 hours  Knowing if she is wet or dry  Can pull pants down and up  Wanting to learn  Can tell you if she is going to have a bowel movement  Plan for toilet breaks often. Children use the toilet as many as 10 times each day.  Teach your child to wash her hands after using the toilet.  Clean potty-chairs after every use.  Take the child to choose underwear when she feels ready to do so.    SAFETY  Make sure your rupinder car safety seat is rear facing until he reaches the highest weight or height allowed by the car safety seats . Once your child reaches these limits, it is time to switch the seat to the forward- facing position.  Make sure the car safety seat is installed correctly in the back seat. The harness straps should be snug against your rupinder chest.  Children watch what you do. Everyone should wear a lap and shoulder seat belt in the car.  Never leave your child alone in your home or yard, especially near cars or machinery, without a responsible adult in charge.  When backing out of the garage or driving in the driveway, have another adult hold your child a safe distance away so he is not in the path of your car.  Have your child wear a helmet that fits properly when riding bikes and trikes.  If it is necessary to keep a gun in your home, store it unloaded and locked with the ammunition locked separately.    WHAT TO EXPECT AT YOUR RUPINDER 2  YEAR VISIT  We will talk about  Creating family routines  Supporting your talking child  Getting along with other children  Getting ready for   Keeping your child safe at home, outside, and in the car      Helpful Resources: National Domestic Violence Hotline: 185.494.8169  Poison Help Line:  228.658.2805  Information About Car Safety Seats: www.safercar.gov/parents  Toll-free Auto Safety Hotline:  223-189-6333  Consistent with Bright Futures: Guidelines for Health Supervision of Infants, Children, and Adolescents, 4th Edition  For more information, go to https://brightfutures.aap.org.                     sonal beckett

## 2021-07-13 ENCOUNTER — NON-APPOINTMENT (OUTPATIENT)
Age: 26
End: 2021-07-13

## 2021-10-05 ENCOUNTER — APPOINTMENT (OUTPATIENT)
Dept: OTOLARYNGOLOGY | Facility: CLINIC | Age: 26
End: 2021-10-05

## 2022-05-19 NOTE — OB PROVIDER LABOR PROGRESS NOTE - ASSESSMENT
attg note  cx 4/70/-2  fhr cat 1  cfb removed  epid in place  cont pit ind  t kimi marin
attg note  cx 4/80/-2  arm clear  fhr cat 1  cont induction  t kimi marin
attg note  pt has been fully dilated and either laboring down or pushing x 3 hours with no descent of vertex with ++ molding  fhr cat 1  disc with couple recommendation for c/s---fully reviewed  will proceed when room available  tana bolden md
26 y/o  eIOL  - CB placed, both balloon inflated with 60cc of NS  - continue IOL with PO cytotec if contractions space out  - transition to pitocin@4a    Plan per Dr. Cameron Barba  PGY-1
23
25

## 2023-06-09 ENCOUNTER — APPOINTMENT (OUTPATIENT)
Dept: HUMAN REPRODUCTION | Facility: CLINIC | Age: 28
End: 2023-06-09
Payer: COMMERCIAL

## 2023-06-09 PROCEDURE — 99205 OFFICE O/P NEW HI 60 MIN: CPT | Mod: 25

## 2023-06-09 PROCEDURE — 36415 COLL VENOUS BLD VENIPUNCTURE: CPT

## 2023-06-09 PROCEDURE — 76830 TRANSVAGINAL US NON-OB: CPT

## 2023-06-23 ENCOUNTER — TRANSCRIPTION ENCOUNTER (OUTPATIENT)
Age: 28
End: 2023-06-23

## 2023-07-10 ENCOUNTER — APPOINTMENT (OUTPATIENT)
Dept: OBGYN | Facility: CLINIC | Age: 28
End: 2023-07-10
Payer: COMMERCIAL

## 2023-07-10 PROCEDURE — 99214 OFFICE O/P EST MOD 30 MIN: CPT

## 2023-07-10 PROCEDURE — 36415 COLL VENOUS BLD VENIPUNCTURE: CPT

## 2023-07-10 PROCEDURE — 76830 TRANSVAGINAL US NON-OB: CPT | Mod: 59

## 2023-07-31 ENCOUNTER — APPOINTMENT (OUTPATIENT)
Dept: OBGYN | Facility: CLINIC | Age: 28
End: 2023-07-31
Payer: COMMERCIAL

## 2023-07-31 PROCEDURE — 36415 COLL VENOUS BLD VENIPUNCTURE: CPT

## 2023-07-31 PROCEDURE — 76817 TRANSVAGINAL US OBSTETRIC: CPT

## 2023-08-21 ENCOUNTER — APPOINTMENT (OUTPATIENT)
Dept: OBGYN | Facility: CLINIC | Age: 28
End: 2023-08-21
Payer: COMMERCIAL

## 2023-08-21 PROCEDURE — 0502F SUBSEQUENT PRENATAL CARE: CPT

## 2023-08-21 PROCEDURE — 36415 COLL VENOUS BLD VENIPUNCTURE: CPT

## 2023-09-18 ENCOUNTER — APPOINTMENT (OUTPATIENT)
Dept: OBGYN | Facility: CLINIC | Age: 28
End: 2023-09-18
Payer: COMMERCIAL

## 2023-09-18 PROCEDURE — 0502F SUBSEQUENT PRENATAL CARE: CPT

## 2023-09-18 PROCEDURE — 36415 COLL VENOUS BLD VENIPUNCTURE: CPT

## 2023-10-23 ENCOUNTER — APPOINTMENT (OUTPATIENT)
Dept: ANTEPARTUM | Facility: CLINIC | Age: 28
End: 2023-10-23
Payer: COMMERCIAL

## 2023-10-23 ENCOUNTER — APPOINTMENT (OUTPATIENT)
Dept: OBGYN | Facility: CLINIC | Age: 28
End: 2023-10-23
Payer: COMMERCIAL

## 2023-10-23 ENCOUNTER — ASOB RESULT (OUTPATIENT)
Age: 28
End: 2023-10-23

## 2023-10-23 PROCEDURE — 90686 IIV4 VACC NO PRSV 0.5 ML IM: CPT

## 2023-10-23 PROCEDURE — 0502F SUBSEQUENT PRENATAL CARE: CPT

## 2023-10-23 PROCEDURE — 76811 OB US DETAILED SNGL FETUS: CPT

## 2023-10-23 PROCEDURE — 90471 IMMUNIZATION ADMIN: CPT

## 2023-11-20 ENCOUNTER — APPOINTMENT (OUTPATIENT)
Dept: OBGYN | Facility: CLINIC | Age: 28
End: 2023-11-20
Payer: COMMERCIAL

## 2023-11-20 PROCEDURE — 0502F SUBSEQUENT PRENATAL CARE: CPT

## 2023-12-05 ENCOUNTER — APPOINTMENT (OUTPATIENT)
Dept: OBGYN | Facility: CLINIC | Age: 28
End: 2023-12-05
Payer: COMMERCIAL

## 2023-12-05 PROCEDURE — 36415 COLL VENOUS BLD VENIPUNCTURE: CPT

## 2023-12-05 PROCEDURE — 0502F SUBSEQUENT PRENATAL CARE: CPT

## 2024-01-05 ENCOUNTER — APPOINTMENT (OUTPATIENT)
Dept: OBGYN | Facility: CLINIC | Age: 29
End: 2024-01-05
Payer: COMMERCIAL

## 2024-01-05 PROCEDURE — 76819 FETAL BIOPHYS PROFIL W/O NST: CPT | Mod: 59

## 2024-01-05 PROCEDURE — 90471 IMMUNIZATION ADMIN: CPT

## 2024-01-05 PROCEDURE — 90715 TDAP VACCINE 7 YRS/> IM: CPT

## 2024-01-05 PROCEDURE — 0502F SUBSEQUENT PRENATAL CARE: CPT

## 2024-01-05 PROCEDURE — 76816 OB US FOLLOW-UP PER FETUS: CPT

## 2024-01-18 ENCOUNTER — NON-APPOINTMENT (OUTPATIENT)
Age: 29
End: 2024-01-18

## 2024-01-18 ENCOUNTER — APPOINTMENT (OUTPATIENT)
Dept: OTOLARYNGOLOGY | Facility: CLINIC | Age: 29
End: 2024-01-18
Payer: COMMERCIAL

## 2024-01-18 VITALS — BODY MASS INDEX: 31.15 KG/M2 | HEIGHT: 61 IN | WEIGHT: 165 LBS

## 2024-01-18 DIAGNOSIS — K21.9 GASTRO-ESOPHAGEAL REFLUX DISEASE W/OUT ESOPHAGITIS: ICD-10-CM

## 2024-01-18 DIAGNOSIS — H92.02 OTALGIA, LEFT EAR: ICD-10-CM

## 2024-01-18 DIAGNOSIS — R09.81 NASAL CONGESTION: ICD-10-CM

## 2024-01-18 DIAGNOSIS — R07.0 PAIN IN THROAT: ICD-10-CM

## 2024-01-18 DIAGNOSIS — M26.609 UNSPECIFIED TEMPOROMANDIBULAR JOINT DISORDER: ICD-10-CM

## 2024-01-18 DIAGNOSIS — H61.22 IMPACTED CERUMEN, LEFT EAR: ICD-10-CM

## 2024-01-18 PROCEDURE — 31231 NASAL ENDOSCOPY DX: CPT

## 2024-01-18 PROCEDURE — 69210 REMOVE IMPACTED EAR WAX UNI: CPT

## 2024-01-18 PROCEDURE — 99214 OFFICE O/P EST MOD 30 MIN: CPT | Mod: 25

## 2024-01-18 NOTE — END OF VISIT
[FreeTextEntry3] : I personally saw and examined LIU LARSON in detail. I spoke to JOSE CARLOS Nichole regarding the assessment and plan of care. I reviewed the above assessment and plan of care, and agree. I have made changes in changes in the body of the note where appropriate.I personally reviewed the HPI, PMH, FH, SH, ROS and medications/allergies. I have spoken to JOSE CARLOS Nichole regarding the history and have personally determined the assessment and plan of care, and documented this myself. I was present and participated in all key portions of the encounter and all procedures noted above. I have made changes in the body of the note where appropriate.   Attesting Faculty: See Attending Signature Below

## 2024-01-18 NOTE — HISTORY OF PRESENT ILLNESS
[de-identified] : 29 y/o F, 34 weeks pregnant, with 2 weeks of throat and ear discomfort, feels the ear pain is worse.  Pos GERD, had taken Pepcid and tums.  No trouble with eating or drinking.  Symptoms are worse at night and in the morning.  No change in hearing. No vertigo.  Now also 2 days age developed URI with nasal congestion and PND.  Using nasal saline.  constant clicking when swallowing x 2 weeks

## 2024-01-18 NOTE — REASON FOR VISIT
[Subsequent Evaluation] : a subsequent evaluation for [FreeTextEntry2] : Loss of voice and throat discomfort.

## 2024-01-18 NOTE — ASSESSMENT
[FreeTextEntry1] : Left otalgia, likely due to Left TMJ. or possibly Cerumen and hair on left TM: - Cerumen and hair removed from left ear - - discussed etiology of TMJ and why can cause referred ear pain - advised to use warm compresses, soft diet, no chewing gum - recommend referral to dentist or oral surgery for TMJ eval/treatment if symptoms persist - Avoid NSAID's during pregnancy.   URI with nasal congestion and mild raspy voice x 2 days, Likely Viral: - Limited treatment due to pregnancy  - Start hypertonic saline - Can do steam treatments.  - Hesitant to give Flonase during pregnancy, however, If interested will need to clear it with OB-GYN

## 2024-01-18 NOTE — PHYSICAL EXAM
[Midline] : trachea located in midline position [Normal] : no rashes [de-identified] : Large misalignment during opening and closing of the mouth on left.  [de-identified] : Cerumen and hair on the left.  Right clear.  [Nasal Endoscopy Performed] : nasal endoscopy was performed, see procedure section for findings

## 2024-01-18 NOTE — PROCEDURE
[FreeTextEntry3] : Procedure - Cerumen Removal.  After informed verbal consent is obtained, cerumen is removed from the left ear canal with a alligator and suction.  Normal appearing canal following removal. [FreeTextEntry6] : Flexible scope #44 was used. Right nasal passage with normal inferior, middle and superior turbinates. Nasal passage patent with clear middle meatus and sphenoethmoid recess. Left nasal passage with normal inferior, middle and superior turbinates. Nasal passage was patent with clear middle meatus and sphenoethmoid recess. No mucopurulence or polyps appreciated. Nasopharynx clear.

## 2024-01-22 ENCOUNTER — APPOINTMENT (OUTPATIENT)
Dept: OBGYN | Facility: CLINIC | Age: 29
End: 2024-01-22

## 2024-01-23 ENCOUNTER — APPOINTMENT (OUTPATIENT)
Dept: OBGYN | Facility: CLINIC | Age: 29
End: 2024-01-23
Payer: COMMERCIAL

## 2024-01-23 PROCEDURE — 0502F SUBSEQUENT PRENATAL CARE: CPT

## 2024-02-08 ENCOUNTER — APPOINTMENT (OUTPATIENT)
Dept: OBGYN | Facility: CLINIC | Age: 29
End: 2024-02-08
Payer: COMMERCIAL

## 2024-02-08 PROCEDURE — 0502F SUBSEQUENT PRENATAL CARE: CPT

## 2024-02-15 ENCOUNTER — APPOINTMENT (OUTPATIENT)
Dept: OBGYN | Facility: CLINIC | Age: 29
End: 2024-02-15
Payer: COMMERCIAL

## 2024-02-15 PROCEDURE — 0502F SUBSEQUENT PRENATAL CARE: CPT

## 2024-02-22 ENCOUNTER — APPOINTMENT (OUTPATIENT)
Dept: OBGYN | Facility: CLINIC | Age: 29
End: 2024-02-22
Payer: COMMERCIAL

## 2024-02-22 PROCEDURE — 76819 FETAL BIOPHYS PROFIL W/O NST: CPT | Mod: 59

## 2024-02-22 PROCEDURE — 76816 OB US FOLLOW-UP PER FETUS: CPT

## 2024-02-22 PROCEDURE — 0502F SUBSEQUENT PRENATAL CARE: CPT

## 2024-02-27 ENCOUNTER — INPATIENT (INPATIENT)
Facility: HOSPITAL | Age: 29
LOS: 2 days | Discharge: HOME | End: 2024-03-01
Attending: STUDENT IN AN ORGANIZED HEALTH CARE EDUCATION/TRAINING PROGRAM | Admitting: STUDENT IN AN ORGANIZED HEALTH CARE EDUCATION/TRAINING PROGRAM
Payer: COMMERCIAL

## 2024-02-27 VITALS — HEART RATE: 89 BPM | OXYGEN SATURATION: 100 %

## 2024-02-27 DIAGNOSIS — Z98.891 HISTORY OF UTERINE SCAR FROM PREVIOUS SURGERY: Chronic | ICD-10-CM

## 2024-02-27 LAB
BASOPHILS # BLD AUTO: 0.03 K/UL — SIGNIFICANT CHANGE UP (ref 0–0.2)
BASOPHILS NFR BLD AUTO: 0.3 % — SIGNIFICANT CHANGE UP (ref 0–2)
BLD GP AB SCN SERPL QL: NEGATIVE — SIGNIFICANT CHANGE UP
EOSINOPHIL # BLD AUTO: 0.1 K/UL — SIGNIFICANT CHANGE UP (ref 0–0.5)
EOSINOPHIL NFR BLD AUTO: 1 % — SIGNIFICANT CHANGE UP (ref 0–6)
HCT VFR BLD CALC: 38.1 % — SIGNIFICANT CHANGE UP (ref 34.5–45)
HGB BLD-MCNC: 12.7 G/DL — SIGNIFICANT CHANGE UP (ref 11.5–15.5)
IMM GRANULOCYTES NFR BLD AUTO: 0.5 % — SIGNIFICANT CHANGE UP (ref 0–0.9)
LYMPHOCYTES # BLD AUTO: 1.82 K/UL — SIGNIFICANT CHANGE UP (ref 1–3.3)
LYMPHOCYTES # BLD AUTO: 18.3 % — SIGNIFICANT CHANGE UP (ref 13–44)
MCHC RBC-ENTMCNC: 28.6 PG — SIGNIFICANT CHANGE UP (ref 27–34)
MCHC RBC-ENTMCNC: 33.3 GM/DL — SIGNIFICANT CHANGE UP (ref 32–36)
MCV RBC AUTO: 85.8 FL — SIGNIFICANT CHANGE UP (ref 80–100)
MONOCYTES # BLD AUTO: 1.15 K/UL — HIGH (ref 0–0.9)
MONOCYTES NFR BLD AUTO: 11.6 % — SIGNIFICANT CHANGE UP (ref 2–14)
NEUTROPHILS # BLD AUTO: 6.78 K/UL — SIGNIFICANT CHANGE UP (ref 1.8–7.4)
NEUTROPHILS NFR BLD AUTO: 68.3 % — SIGNIFICANT CHANGE UP (ref 43–77)
NRBC # BLD: 0 /100 WBCS — SIGNIFICANT CHANGE UP (ref 0–0)
PLATELET # BLD AUTO: 163 K/UL — SIGNIFICANT CHANGE UP (ref 150–400)
RBC # BLD: 4.44 M/UL — SIGNIFICANT CHANGE UP (ref 3.8–5.2)
RBC # FLD: 14.5 % — SIGNIFICANT CHANGE UP (ref 10.3–14.5)
RH IG SCN BLD-IMP: POSITIVE — SIGNIFICANT CHANGE UP
WBC # BLD: 9.93 K/UL — SIGNIFICANT CHANGE UP (ref 3.8–10.5)
WBC # FLD AUTO: 9.93 K/UL — SIGNIFICANT CHANGE UP (ref 3.8–10.5)

## 2024-02-27 RX ORDER — SODIUM CHLORIDE 9 MG/ML
1000 INJECTION, SOLUTION INTRAVENOUS
Refills: 0 | Status: DISCONTINUED | OUTPATIENT
Start: 2024-02-27 | End: 2024-02-28

## 2024-02-27 RX ORDER — CITRIC ACID/SODIUM CITRATE 300-500 MG
15 SOLUTION, ORAL ORAL EVERY 6 HOURS
Refills: 0 | Status: DISCONTINUED | OUTPATIENT
Start: 2024-02-27 | End: 2024-02-28

## 2024-02-27 RX ORDER — CHLORHEXIDINE GLUCONATE 213 G/1000ML
1 SOLUTION TOPICAL DAILY
Refills: 0 | Status: DISCONTINUED | OUTPATIENT
Start: 2024-02-27 | End: 2024-02-28

## 2024-02-27 RX ORDER — OXYTOCIN 10 UNIT/ML
333.33 VIAL (ML) INJECTION
Qty: 20 | Refills: 0 | Status: DISCONTINUED | OUTPATIENT
Start: 2024-02-27 | End: 2024-03-01

## 2024-02-27 RX ORDER — OXYTOCIN 10 UNIT/ML
4 VIAL (ML) INJECTION
Qty: 30 | Refills: 0 | Status: DISCONTINUED | OUTPATIENT
Start: 2024-02-27 | End: 2024-03-01

## 2024-02-27 RX ADMIN — SODIUM CHLORIDE 125 MILLILITER(S): 9 INJECTION, SOLUTION INTRAVENOUS at 22:00

## 2024-02-27 RX ADMIN — SODIUM CHLORIDE 125 MILLILITER(S): 9 INJECTION, SOLUTION INTRAVENOUS at 21:30

## 2024-02-27 NOTE — OB PROVIDER H&P - ASSESSMENT
27yo  @39 weeks and 5 days admitted for a TOLAC  Plan:  - Admit to L&D  - Routine labs, IVF, NPO  - EFM:   - GBS:  - EFW:   - Augmentaton of labor with _   - OR Expectant management  - Discussed with  _      27yo  @39 weeks and 5 days admitted for an eIOL, desiring to TOLAC.   Plan:  - Admit to L&D  - Routine labs, IVF, NPO  - EFM: Category I   - GBS: Unknown   - EFW: 3572g  - Cervical balloon   - IV Pitocin 2x2   - Anticipate    - Discussed with Dr. Knox      29yo  @39 weeks and 5 days admitted for an eIOL, desiring to TOLAC.   Plan:  - Admit to L&D  - Routine labs, IVF, NPO  - EFM: Category I   - GBS: Negative   - EFW: 3572g  - Cervical balloon   - IV Pitocin 2x2   - Anticipate    - Discussed with Dr. Knox

## 2024-02-27 NOTE — OB PROVIDER H&P - HISTORY OF PRESENT ILLNESS
29yo  @39 weeks and 5 days presents with an eIOL, desiring to TOLAC. Patient admits to good fetal movement, and denies painful contractions, vaginal bleeding, or loss of fluids at this time.   GBS:   EFW: 3572g  OB: ,  2/2 arrest of decent, full-term, boy, 8#9    GYN: Denies history of fibroids, abnormal pap smears, STDs, or ovarian cysts   Allergies: NKDA   Medical Hx: Denies history of HTN, DM, bleeding disorders, thyroid disorders   Medications: PNV   Surgical Hx:  section,    Social Hx: Denies x3, no anxiety or depression  27yo  @39 weeks and 5 days presents for an eIOL, desiring to TOLAC. Patient admits to good fetal movement, and denies painful contractions, vaginal bleeding, or loss of fluids at this time.   GBS:   EFW: 3572g  OB: ,  2/2 arrest of decent, full-term, boy, 8#9    GYN: Denies history of fibroids, abnormal pap smears, STDs, or ovarian cysts   Allergies: NKDA   Medical Hx: Denies history of HTN, DM, bleeding disorders, thyroid disorders   Medications: PNV   Surgical Hx:  section,    Social Hx: Denies x3, no anxiety or depression  27yo  @39 weeks and 5 days admitted for an eIOL, desiring to TOLAC. Patient admits to good fetal movement, and denies painful contractions, vaginal bleeding, or loss of fluids at this time.   GBS:   EFW: 3572g  OB: ,  2/2 arrest of decent, full-term, boy, 8#9    GYN: Denies history of fibroids, abnormal pap smears, STDs, or ovarian cysts   Allergies: NKDA   Medical Hx: Denies history of HTN, DM, bleeding disorders, thyroid disorders   Medications: PNV   Surgical Hx:  section,    Social Hx: Denies x3, no anxiety or depression  27yo  @39 weeks and 5 days admitted for an eIOL, desiring to TOLAC. Patient admits to good fetal movement, and denies painful contractions, vaginal bleeding, or loss of fluids at this time.   GBS: Negative   EFW: 3572g  OB: ,  2/ arrest of decent, full-term, boy, 8#9    GYN: Denies history of fibroids, abnormal pap smears, STDs, or ovarian cysts   Allergies: NKDA   Medical Hx: Denies history of HTN, DM, bleeding disorders, thyroid disorders   Medications: PNV   Surgical Hx:  section,    Social Hx: Denies x3, no anxiety or depression

## 2024-02-27 NOTE — OB PROVIDER H&P - ATTENDING COMMENTS
29yo  @39 weeks and 5 days admitted for an eIOL, desiring to TOLAC.     Admit  Labs  Consents  EFM/toco  IVH/clears  for balloon followed by sean Knox  OB attg

## 2024-02-27 NOTE — OB PROVIDER LABOR PROGRESS NOTE - NS_SUBJECTIVE/OBJECTIVE_OBGYN_ALL_OB_FT
Pt seen for placement of CB. CB placed without incident with 60cc of saline in the uterine balloon and 60cc of saline in the vaginal balloon. Pt tolerated well, comfortable with an epidural.

## 2024-02-27 NOTE — OB PROVIDER H&P - NSHPPHYSICALEXAM_GEN_ALL_CORE
CV: S1,S2  Pulmonary: Clear to auscultation bilaterally    Abdomen: Gravid abdomen  Extremities: Nontender to palpation bilaterally     EFM: 130hr, moderate variability, +accelerations, -decelerations   TOCO: Irregular contractions   SVE: 2/70/-3  BSUS: Vertex CV: S1,S2  Pulmonary: Clear to auscultation bilaterally    Abdomen: Gravid abdomen  Extremities: Nontender to palpation bilaterally     EFM: 130hr, moderate variability, +accelerations, -decelerations   TOCO: Irregular contractions   SVE: 2/ 70/ -3  BSUS: Vertex

## 2024-02-27 NOTE — OB PROVIDER LABOR PROGRESS NOTE - ASSESSMENT
27yo  @39 weeks and 5 days admitted for an eIOL, desiring to TOLAC   Plan:  -Cervical balloon in place  -Patient is comfortable with an epidural  -IV Pitocin ordered  -Anticipate

## 2024-02-27 NOTE — OB RN PATIENT PROFILE - INTERNATIONAL TRAVEL
Assessment:  1  Primary osteoarthritis of both knees  XR knee 3 vw right non injury    XR knee 3 vw left non injury      2  Baker's cyst of knee, left          Patient Active Problem List   Diagnosis   • Closed fracture distal radius and ulna, right, initial encounter   • Anxiety   • Gastroesophageal reflux disease with esophagitis   • History of pericarditis   • Heart murmur   • Primary hyperparathyroidism (HCC)   • Thyroid nodule   • Vitamin D deficiency   • Atypical chest pain   • Migraine without aura and without status migrainosus, not intractable   • Dizziness and giddiness   • Insomnia   • Cervicalgia   • Abnormal CT scan of lung   • Irritable bowel syndrome with diarrhea   • Elevated amylase and lipase   • Paresthesias   • Carpal tunnel syndrome of left wrist   • Dysphonia   • Reflux laryngitis   • Paresis of left vocal fold   • Glottic insufficiency   • Muscle tension dysphonia   • TMJ dysfunction   • Pharyngoesophageal dysphagia   • Palpitations   • Hallux abducto valgus, bilateral   • Pincer nail deformity   • Osteoporosis with current pathological fracture   • Parathyroid related hypercalcemia (HCC)   • Cervical myofascial pain syndrome   • Edema of both ankles   • Aneurysm (HCC)   • Calf pain   • Lung nodule   • Myalgia   • Pericardial effusion   • SOBOE (shortness of breath on exertion)   • Elevated blood-pressure reading without diagnosis of hypertension   • Skin tag of anus   • H/O parathyroidectomy (HCC)   • [facility]inter[facility] disease) ([facility] )           Plan      75 y o  female with bilateral knee pain  Upon review of the bilateral knee x-rays, a thorough history and my examination, Lili Martinez is presenting with signs and symptoms consistent with primary osteoarthritis and bilateral baker's cyst  Diagnosis, treatment options and associated risks were discussed with the patient including no treatment, nonsurgical treatment and potential for surgical intervention    The patient was given the opportunity to ask questions regarding each  Subjective:     Patient ID:    Chief Aguero [facility] 75 y o  female      HPI    Patient comes in today for initial evaluation bilateral knee pain, and left knee baker's cyst  Patient states she has had chronic intermittent knee pain and swelling  She notes the swelling is in the posterior left knee and bilateral medial calf region  She describes her left posterior knee pain as severe at times  She notes a change in her gait due to her knee pain  She notes she has become knocked knee  She denies any history of injury or trauma to either lower extremity  Lastly she reports she has tried lubricant and corticosteroid injections in the past, which she states she did get symptomatic relief from  The following portions of the patient's history were reviewed and updated as appropriate: allergies, current medications, past family history, past social history, past surgical history and problem list         Social History     Socioeconomic History   • Marital status: /Civil Union     Spouse name: Not on file   • Number of children: Not on file   • Years of education: Not on file   • Highest education level: Not on file   Occupational History   • Not on file   Tobacco Use   • Smoking status: Never   • Smokeless tobacco: Never   Vaping Use   • Vaping Use: Never used   Substance and Sexual Activity   • Alcohol use: Not Currently     Comment: Rarely    • Drug use: No   • Sexual activity: Yes     Partners: Male   Other Topics Concern   • Not on file   Social History Narrative    WORK:    1   ([employer]; [employer]) - concern for mold exposure    2  [employer]        HOBBIES:    1  Singing    2  Dancing    3  Hx of ceramics (+ dust)        PETS:    1    Dogs    -  Denies birds        TRAVEL:    -  [place]         EXPOSURES:    Denies mold; down pillows/comforters; hot tubs     Social Determinants of Health     Financial Resource Strain: Low Risk • Difficulty of Paying Living Expenses: Not hard at all   Food Insecurity: Not on file   Transportation Needs: No Transportation Needs   • Lack of Transportation (Medical): No   • Lack of Transportation (Non-Medical):  No   Physical Activity: Not on file   Stress: Not on file   Social Connections: Not on file   Intimate Partner Violence: Not on file   Housing Stability: Not on file     Past Medical History:   Diagnosis Date   • Anxiety    • Arthritis    • Back pain    • Balance problems    • Chest pain     heaviness   • Difficulty swallowing    • Dizziness    • Dry cough    • Gait disorder    • GERD (gastroesophageal reflux disease)    • Hyperparathyroidism (HCC)    • Hypertension    • Increased frequency of headaches    • Migraines    • Neck pain    • Numbness and tingling     bles   • Palpitations    • Pericarditis    • Thyroid disease     nodule   • Trouble in sleeping    • Wears glasses      Past Surgical History:   Procedure Laterality Date   • APPENDECTOMY     • CHOLECYSTECTOMY      laparoscopic   • COLONOSCOPY     • KNEE SURGERY Left    • PERICARDIAL WINDOW     • OK EXPLORE PARATHYROID GLANDS N/A 8/17/2022    Procedure: PARATHYROIDECTOMY, 4 GLAND EXPLORATION;  Surgeon: Bethany Abarca MD;  Location: [facility];  Service: ENT   • OK OPEN RX DISTAL RADIUS FX, EXTRA-ARTICULAR Right 3/22/2018    Procedure: OPEN REDUCTION W/ INTERNAL FIXATION (ORIF) RADIUS, splint application;  Surgeon: Gaby Kay MD;  Location: [facility];  Service: Orthopedics   • UPPER GASTROINTESTINAL ENDOSCOPY       Allergies   Allergen Reactions   • Ciprofloxacin Myalgia     Current Outpatient Medications on File Prior to Visit   Medication Sig Dispense Refill   • acetaminophen (TYLENOL) 500 mg tablet Take 500-1,000 mg by mouth every 6 (six) hours as needed for mild pain     • albuterol (PROVENTIL HFA,VENTOLIN HFA) 90 mcg/act inhaler Inhale 2 puffs every 6 (six) hours as needed for wheezing or shortness of breath 8 g 0   • ALPRAZolam (XANAX) 0 5 mg tablet Take 1 tablet (0 5 mg total) by mouth daily at bedtime as needed for anxiety or sleep 20 tablet 1   • ascorbic acid (VITAMIN C) 500 mg tablet Take 500 mg by mouth daily     • b complex vitamins tablet Take 1 tablet by mouth daily     • Cholecalciferol (Vitamin D) 50 MCG (2000 UT) CAPS Take 1 capsule (2,000 Units total) by mouth daily (Patient taking differently: Take 3,000 Units by mouth daily)     • dicyclomine (BENTYL) 10 mg capsule TAKE 1 CAPSULE BY MOUTH 4 TIMES A DAY (BEFORE MEALS AND AT BEDTIME) (Patient taking differently: Take 10 mg by mouth 4 (four) times a day as needed) 360 capsule 1   • furosemide (LASIX) 20 mg tablet Take 1 tablet (20 mg total) by mouth daily as needed (leg swelling) 10 tablet 0   • Magnesium 300 MG CAPS Take 300 mg by mouth in the morning     • metoprolol succinate (TOPROL-XL) 25 mg 24 hr tablet Take 0 5 tablets (12 5 mg total) by mouth daily at bedtime 45 tablet 2   • Multiple Vitamins-Calcium (ONE-A-DAY WOMENS PO) Take 1 tablet by mouth daily     • NON FORMULARY Take 2 tablets by mouth in the morning MK-7     • omeprazole (PriLOSEC) 20 mg delayed release capsule Take 1 capsule (20 mg total) by mouth 2 (two) times a day before meals 180 capsule 2   • Riboflavin (VITAMIN B-2 PO) Take 250 mg by mouth in the morning     • rizatriptan (MAXALT) 10 MG tablet Take 1 tablet (10 mg total) by mouth once as needed for migraine May repeat in 2 hours if needed  Max 2/24 hours, 9/month  9 tablet 6   • sucralfate (CARAFATE) 1 g/10 mL suspension Take 10 mL (1 g total) by mouth daily at bedtime 300 mL 0   • traZODone (DESYREL) 50 mg tablet Take 1 tablet (50 mg total) by mouth daily at bedtime 30 tablet 1     No current facility-administered medications on file prior to visit  Objective:    Review of Systems   Constitutional: Negative for chills and fever  HENT: Negative for ear pain and sore throat  Eyes: Negative for pain and visual disturbance  Respiratory: Negative for cough and shortness of breath  Cardiovascular: Negative for chest pain and palpitations  Gastrointestinal: Negative for abdominal pain and vomiting  Genitourinary: Negative for dysuria and hematuria  Musculoskeletal: Negative for arthralgias and back pain  Skin: Negative for color change and rash  Neurological: Negative for seizures and syncope  All other systems reviewed and are negative  Right Knee Exam     Tenderness   The patient is experiencing tenderness in the medial joint line and lateral joint line  Range of Motion   Extension: 0   Flexion: 120     Tests   Varus: negative Valgus: negative    Other   Erythema: absent  Sensation: normal  Pulse: present  Swelling: none  Effusion: no effusion present    Comments:  Varicosities around the knee and distal lower extremity  Spider veins   Palpable baker's cyst       Left Knee Exam     Tenderness   The patient is experiencing tenderness in the medial joint line and lateral joint line  Range of Motion   Extension: 0   Flexion: 120     Tests   Varus: negative Valgus: negative    Other   Erythema: absent  Sensation: normal  Pulse: present  Swelling: none  Effusion: no effusion present    Comments:  Varicosities around the knee and distal lower extremity   Spider veins   Palpable baker's cyst            Physical Exam  HENT:      Head: Normocephalic  Eyes:      Extraocular Movements: Extraocular movements intact  Pulmonary:      Effort: Pulmonary effort is normal    Musculoskeletal:         General: Normal range of motion  Right knee: No effusion  Left knee: No effusion  Skin:     General: Skin is warm and dry  Neurological:      General: No focal deficit present  Mental Status: She is alert  Psychiatric:         Behavior: Behavior normal          Large joint arthrocentesis: bilateral knee  Universal Protocol:  Consent: Verbal consent obtained    Risks and benefits: risks, benefits and alternatives were discussed  Consent given by: patient  Timeout called at: 12/5/2022 12:20 PM   Patient understanding: patient states understanding of the procedure being performed  Patient identity confirmed: verbally with patient    Supporting Documentation  Indications: pain and diagnostic evaluation   Procedure Details  Location: knee - bilateral knee  Needle size: 22 G  Ultrasound guidance: no    Medications (Right): 10 mL ropivacaine 0 5 %; 40 mg triamcinolone acetonide 40 mg/mLMedications (Left): 10 mL ropivacaine 0 5 %; 40 mg triamcinolone acetonide 40 mg/mL   Patient tolerance: patient tolerated the procedure well with no immediate complications  Dressing:  Sterile dressing applied    2 ml Naropin (ropivacaine HCL injection)   05%             I have personally reviewed pertinent films in PACS and my interpretation is bilateral knee x-rays taken today demonstrate degenerative changes with significant lateral joint space narrowing, bone spur formation, and sclerosisnoted       Scribe Attestation    I,:  Grisel Khoury am acting as a scribe while in the presence of the attending physician :       I,:  Glenis Gold, DO personally performed the services described in this documentation    as scribed in my presence :           Portions of the record may have been created with voice recognition software   Occasional wrong word or "sound a like" substitutions may have occurred due to the inherent limitations of voice recognition software   Read the chart carefully and recognize, using context, where substitutions have occurred  No

## 2024-02-27 NOTE — OB RN PATIENT PROFILE - NS_LMP_OBGYN_ALL_OB_DT
Assumed care for pt from Fais[facility] [employer]. Previous RN stated he gave report to ICU. Pt to PACU at this time. Pt resting comfortably in no distress. 25-May-2023

## 2024-02-27 NOTE — OB RN PATIENT PROFILE - FALL HARM RISK - UNIVERSAL INTERVENTIONS
Bed in lowest position, wheels locked, appropriate side rails in place/Call bell, personal items and telephone in reach/Instruct patient to call for assistance before getting out of bed or chair/Non-slip footwear when patient is out of bed/[place] to call system/Physically safe environment - no spills, clutter or unnecessary equipment/Purposeful Proactive Rounding/Room/bathroom lighting operational, light cord in reach

## 2024-02-28 LAB — T PALLIDUM AB TITR SER: NEGATIVE — SIGNIFICANT CHANGE UP

## 2024-02-28 PROCEDURE — 59400 OBSTETRICAL CARE: CPT

## 2024-02-28 RX ORDER — HYDROCORTISONE 1 %
1 OINTMENT (GRAM) TOPICAL EVERY 6 HOURS
Refills: 0 | Status: DISCONTINUED | OUTPATIENT
Start: 2024-02-28 | End: 2024-03-01

## 2024-02-28 RX ORDER — IBUPROFEN 200 MG
600 TABLET ORAL EVERY 6 HOURS
Refills: 0 | Status: DISCONTINUED | OUTPATIENT
Start: 2024-02-28 | End: 2024-03-01

## 2024-02-28 RX ORDER — MAGNESIUM HYDROXIDE 400 MG/1
30 TABLET, CHEWABLE ORAL
Refills: 0 | Status: DISCONTINUED | OUTPATIENT
Start: 2024-02-28 | End: 2024-03-01

## 2024-02-28 RX ORDER — DIPHENHYDRAMINE HCL 50 MG
25 CAPSULE ORAL EVERY 6 HOURS
Refills: 0 | Status: DISCONTINUED | OUTPATIENT
Start: 2024-02-28 | End: 2024-03-01

## 2024-02-28 RX ORDER — SIMETHICONE 80 MG/1
80 TABLET, CHEWABLE ORAL EVERY 4 HOURS
Refills: 0 | Status: DISCONTINUED | OUTPATIENT
Start: 2024-02-28 | End: 2024-03-01

## 2024-02-28 RX ORDER — ACETAMINOPHEN 500 MG
975 TABLET ORAL
Refills: 0 | Status: DISCONTINUED | OUTPATIENT
Start: 2024-02-28 | End: 2024-03-01

## 2024-02-28 RX ORDER — OXYTOCIN 10 UNIT/ML
41.67 VIAL (ML) INJECTION
Qty: 20 | Refills: 0 | Status: DISCONTINUED | OUTPATIENT
Start: 2024-02-28 | End: 2024-03-01

## 2024-02-28 RX ORDER — TETANUS TOXOID, REDUCED DIPHTHERIA TOXOID AND ACELLULAR PERTUSSIS VACCINE, ADSORBED 5; 2.5; 8; 8; 2.5 [IU]/.5ML; [IU]/.5ML; UG/.5ML; UG/.5ML; UG/.5ML
0.5 SUSPENSION INTRAMUSCULAR ONCE
Refills: 0 | Status: DISCONTINUED | OUTPATIENT
Start: 2024-02-28 | End: 2024-03-01

## 2024-02-28 RX ORDER — OXYCODONE HYDROCHLORIDE 5 MG/1
5 TABLET ORAL ONCE
Refills: 0 | Status: DISCONTINUED | OUTPATIENT
Start: 2024-02-28 | End: 2024-03-01

## 2024-02-28 RX ORDER — SODIUM CHLORIDE 9 MG/ML
3 INJECTION INTRAMUSCULAR; INTRAVENOUS; SUBCUTANEOUS EVERY 8 HOURS
Refills: 0 | Status: DISCONTINUED | OUTPATIENT
Start: 2024-02-28 | End: 2024-02-29

## 2024-02-28 RX ORDER — OXYCODONE HYDROCHLORIDE 5 MG/1
5 TABLET ORAL
Refills: 0 | Status: DISCONTINUED | OUTPATIENT
Start: 2024-02-28 | End: 2024-03-01

## 2024-02-28 RX ORDER — IBUPROFEN 200 MG
600 TABLET ORAL EVERY 6 HOURS
Refills: 0 | Status: COMPLETED | OUTPATIENT
Start: 2024-02-28 | End: 2025-01-26

## 2024-02-28 RX ORDER — LANOLIN
1 OINTMENT (GRAM) TOPICAL EVERY 6 HOURS
Refills: 0 | Status: DISCONTINUED | OUTPATIENT
Start: 2024-02-28 | End: 2024-03-01

## 2024-02-28 RX ORDER — AER TRAVELER 0.5 G/1
1 SOLUTION RECTAL; TOPICAL EVERY 4 HOURS
Refills: 0 | Status: DISCONTINUED | OUTPATIENT
Start: 2024-02-28 | End: 2024-03-01

## 2024-02-28 RX ORDER — KETOROLAC TROMETHAMINE 30 MG/ML
30 SYRINGE (ML) INJECTION ONCE
Refills: 0 | Status: DISCONTINUED | OUTPATIENT
Start: 2024-02-28 | End: 2024-02-28

## 2024-02-28 RX ORDER — DIBUCAINE 1 %
1 OINTMENT (GRAM) RECTAL EVERY 6 HOURS
Refills: 0 | Status: DISCONTINUED | OUTPATIENT
Start: 2024-02-28 | End: 2024-03-01

## 2024-02-28 RX ORDER — BENZOCAINE 10 %
1 GEL (GRAM) MUCOUS MEMBRANE EVERY 6 HOURS
Refills: 0 | Status: DISCONTINUED | OUTPATIENT
Start: 2024-02-28 | End: 2024-03-01

## 2024-02-28 RX ORDER — PRAMOXINE HYDROCHLORIDE 150 MG/15G
1 AEROSOL, FOAM RECTAL EVERY 4 HOURS
Refills: 0 | Status: DISCONTINUED | OUTPATIENT
Start: 2024-02-28 | End: 2024-03-01

## 2024-02-28 RX ADMIN — OXYCODONE HYDROCHLORIDE 5 MILLIGRAM(S): 5 TABLET ORAL at 22:00

## 2024-02-28 RX ADMIN — Medication 600 MILLIGRAM(S): at 18:20

## 2024-02-28 RX ADMIN — Medication 600 MILLIGRAM(S): at 11:17

## 2024-02-28 RX ADMIN — SODIUM CHLORIDE 3 MILLILITER(S): 9 INJECTION INTRAMUSCULAR; INTRAVENOUS; SUBCUTANEOUS at 13:00

## 2024-02-28 RX ADMIN — OXYCODONE HYDROCHLORIDE 5 MILLIGRAM(S): 5 TABLET ORAL at 18:20

## 2024-02-28 RX ADMIN — OXYCODONE HYDROCHLORIDE 5 MILLIGRAM(S): 5 TABLET ORAL at 12:15

## 2024-02-28 RX ADMIN — Medication 4 MILLIUNIT(S)/MIN: at 01:45

## 2024-02-28 RX ADMIN — OXYCODONE HYDROCHLORIDE 5 MILLIGRAM(S): 5 TABLET ORAL at 19:00

## 2024-02-28 RX ADMIN — Medication 600 MILLIGRAM(S): at 17:03

## 2024-02-28 RX ADMIN — Medication 975 MILLIGRAM(S): at 21:30

## 2024-02-28 RX ADMIN — Medication 30 MILLIGRAM(S): at 07:59

## 2024-02-28 RX ADMIN — Medication 975 MILLIGRAM(S): at 10:00

## 2024-02-28 RX ADMIN — Medication 975 MILLIGRAM(S): at 09:12

## 2024-02-28 RX ADMIN — Medication 1 TABLET(S): at 11:18

## 2024-02-28 RX ADMIN — Medication 975 MILLIGRAM(S): at 22:00

## 2024-02-28 RX ADMIN — OXYCODONE HYDROCHLORIDE 5 MILLIGRAM(S): 5 TABLET ORAL at 21:30

## 2024-02-28 RX ADMIN — Medication 30 MILLIGRAM(S): at 05:11

## 2024-02-28 RX ADMIN — OXYCODONE HYDROCHLORIDE 5 MILLIGRAM(S): 5 TABLET ORAL at 11:08

## 2024-02-28 RX ADMIN — Medication 975 MILLIGRAM(S): at 14:47

## 2024-02-28 RX ADMIN — OXYCODONE HYDROCHLORIDE 5 MILLIGRAM(S): 5 TABLET ORAL at 14:45

## 2024-02-28 RX ADMIN — Medication 975 MILLIGRAM(S): at 15:30

## 2024-02-28 RX ADMIN — OXYCODONE HYDROCHLORIDE 5 MILLIGRAM(S): 5 TABLET ORAL at 15:30

## 2024-02-28 RX ADMIN — Medication 600 MILLIGRAM(S): at 12:15

## 2024-02-28 NOTE — OB PROVIDER DELIVERY SUMMARY - NSSELHIDDEN_OBGYN_ALL_OB_FT
[NS_DeliveryAttending1_OBGYN_ALL_OB_FT:[id number]],[NS_DeliveryAssist1_OBGYN_ALL_OB_FT:[id number]=]

## 2024-02-28 NOTE — OB RN DELIVERY SUMMARY - NS_SEPSISRSKCALC_OBGYN_ALL_OB_FT
EOS calculated successfully. EOS Risk Factor: 0.5/1000 live births ([facility] national incidence); GA=39w6d; Temp=99; ROM=2.767; GBS='Negative'; Antibiotics='No antibiotics or any antibiotics < 2 hrs prior to birth'

## 2024-02-28 NOTE — OB RN DELIVERY SUMMARY - BABY A: APGAR 1 MIN MUSCLE TONE, DELIVERY
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 Upon EMS arrival patient had a SBP of 230. Took aspirin prior to EMS arrival, EMS gave one nitroglycerine. Patient is feeling less short of breath but states he just feels lousy. Patient has a history of HTN but cannot afford to have medications filled. Patient has a widespread rash which he says he has a cream for that is not working and has a follow-up scheduled with dermatology for the 18th. ABCs intact.  
(2) well flexed

## 2024-02-28 NOTE — OB RN DELIVERY SUMMARY - NSSELHIDDEN_OBGYN_ALL_OB_FT
[NS_DeliveryAttending1_OBGYN_ALL_OB_FT:[id number]],[NS_DeliveryAssist1_OBGYN_ALL_OB_FT:[id number]],[NS_DeliveryRN_OBGYN_ALL_OB_FT:[id number]=]

## 2024-02-28 NOTE — OB PROVIDER DELIVERY SUMMARY - NSPROVIDERDELIVERYNOTE_OBGYN_ALL_OB_FT
Spontaneous vaginal delivery of liveborn infant from OA position. Head, shoulders, and body delivered easily. Infant was suctioned. No mec. Delayed cord clamping and infant was passed to mother. Cord clamped and cut. Placenta delivered intact with a 3 vessel cord. Fundal massage was given and uterine fundus was found to be firm. Bimanual exam done and minimal clot evacuated. Vaginal exam revealed an intact cervix, vaginal walls and sulci. Patient had a First degree laceration in the perineum that was repaired with 2.0 vicryl suture. Excellent hemostasis was noted. Patient was stable and went to recovery. Count was correct x 2.    Calista Candelaria PGY1

## 2024-02-28 NOTE — OB PROVIDER LABOR PROGRESS NOTE - ASSESSMENT
attg note  cx 6/100/-1  cfb in vagina---removed  fhr cat 1  arm clear  pit at 4  cont present mgmt  tana bolden md

## 2024-02-28 NOTE — OB RN DELIVERY SUMMARY - NS_CORDVESSELS_OBGYN_ALL_OB
SCHWABACHER, LAWRENCE  63y  [facility] F3-4B 014 B      Patient is a 63y old  Male who presents with a chief complaint of Sepsis (19 Oct 2022 14:02)      INTERVAL HPI/OVERNIGHT EVENTS:  Patient feels well. understand the plan of care  Patient reported constipation             FAMILY HISTORY:  Family history of heart disease (Father)    DM (diabetes mellitus) (Mother)    ESRD (end stage renal disease) on dialysis (Mother)      T(C): 35.8 (10-19-22 @ 12:58), Max: 36.3 (10-18-22 @ 21:14)  HR: 78 (10-19-22 @ 12:58) (69 - 79)  BP: 134/60 (10-19-22 @ 12:58) (134/60 - 167/86)  RR: 18 (10-19-22 @ 12:58) (18 - 18)  SpO2: 99% (10-19-22 @ 12:58) (99% - 99%)  Wt(kg): --Vital Signs Last 24 Hrs  T(C): 35.8 (19 Oct 2022 12:58), Max: 36.3 (18 Oct 2022 21:14)  T(F): 96.5 (19 Oct 2022 12:58), Max: 97.3 (18 Oct 2022 21:14)  HR: 78 (19 Oct 2022 12:58) (69 - 79)  BP: 134/60 (19 Oct 2022 12:58) (134/60 - 167/86)  BP(mean): --  RR: 18 (19 Oct 2022 12:58) (18 - 18)  SpO2: 99% (19 Oct 2022 12:58) (99% - 99%)    Parameters below as of 19 Oct 2022 12:58  Patient On (Oxygen Delivery Method): room air        PHYSICAL EXAM:  GENERAL: NAD, well-groomed, well-developed  PULM: Clear to auscultation bilaterally  CARDIAC: Regular rate and rhythm;  GI: Soft, Nontender, Nondistended; Bowel sounds present  EXTREMITIES:  left foot swollen and wrapped     Consultant(s) Notes Reviewed:  [x ] YES  [ ] NO  Care Discussed with Consultants/Other Providers [ x] YES  [ ] NO    LABS:                            7.4    11.48 )-----------( 389      ( 19 Oct 2022 09:20 )             22.7   10-19    133<L>  |  97<L>  |  53<H>  ----------------------------<  91  4.9   |  25  |  5.7<HH>    Ca    8.0<L>      19 Oct 2022 09:20  Phos  3.0     10-18  Mg     2.0     10-19    TPro  6.2  /  Alb  2.3<L>  /  TBili  0.2  /  DBili  x   /  AST  12  /  ALT  6   /  AlkPhos  143<H>  10-19            Culture - Abscess with Gram Stain (collected 16 Oct 2022 17:50)  Source: .Abscess left foot wound  Preliminary Report (18 Oct 2022 19:12):    Rare Morganella morganii    Rare Enterobacter cloacae complex    Rare Proteus vulgaris group    Rare Enterococcus faecium (vancomycin resistant)  Organism: Morganella morganii  Enterobacter cloacae complex  Proteus vulgaris group  Enterococcus faecium (vancomycin resistant) (18 Oct 2022 19:11)  Organism: Enterococcus faecium (vancomycin resistant) (18 Oct 2022 19:11)  Organism: Proteus vulgaris group (18 Oct 2022 19:05)  Organism: Enterobacter cloacae complex (18 Oct 2022 19:05)  Organism: Morganella morganii (18 Oct 2022 19:05)      acetaminophen     Tablet .. 650 milliGRAM(s) Oral every 6 hours PRN  aluminum hydroxide/magnesium hydroxide/simethicone Suspension 30 milliLiter(s) Oral every 4 hours PRN  aspirin enteric coated 81 milliGRAM(s) Oral daily  atorvastatin 40 milliGRAM(s) Oral at bedtime  darbepoetin Injectable Syringe 25 MICROGram(s) IV Push <User Schedule>  dextrose 10% Bolus 125 milliLiter(s) IV Bolus once  dextrose 5%. 1000 milliLiter(s) IV Continuous <Continuous>  dextrose 5%. 1000 milliLiter(s) IV Continuous <Continuous>  dextrose 5%. 1000 milliLiter(s) IV Continuous <Continuous>  dextrose 50% Injectable 25 Gram(s) IV Push once  dextrose 50% Injectable 12.5 Gram(s) IV Push once  dextrose 50% Injectable 25 Gram(s) IV Push once  dextrose Oral Gel 15 Gram(s) Oral once PRN  glucagon  Injectable 1 milliGRAM(s) IntraMuscular once  influenza   Vaccine 0.5 milliLiter(s) IntraMuscular once  insulin glargine Injectable (LANTUS) 15 Unit(s) SubCutaneous at bedtime  insulin lispro (ADMELOG) corrective regimen sliding scale   SubCutaneous three times a day before meals  melatonin 3 milliGRAM(s) Oral at bedtime PRN  meropenem  IVPB 500 milliGRAM(s) IV Intermittent every 24 hours  metoprolol tartrate 50 milliGRAM(s) Oral two times a day  ondansetron Injectable 4 milliGRAM(s) IV Push every 8 hours PRN  sevelamer carbonate 800 milliGRAM(s) Oral three times a day    62 y/o with a PMHx of DM, HTN, ESRD (on dialysis M/W/F, last session on 10/14), CAD s/p 1 stent on 2008 and quadruple CABG on 2012, PAD s/p bilateral angioplasty at [facility], was transferred from [facility] complaining of weakness and unhealed wound in the left foot.    1. 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 hx of PAD   - Cont meropenem 500mg IV daily   - IR for angiogram:  a) Left lower extremity angiogram demonstrating chronically occluded PT and AT with patent anterior tibial vein bypass reconstituting into the DP.   b) No microvascular outflow demonstrates within the calcaneous. No hemodynamically significant stenosis within the left SFA and popliteal artery. No intervention performed  - Blood cx:NTD   - MRI L foot done:  a) Osteomyelitis and erosion of the calcaneus with a vertical pathologic fracture extending to the subtalar joint. Gangrene of overlying subcutaneous tissues. 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  - ID consult appreciated   - Podiatry consult:  a) Local Wound Care; Wound Flushed w/ NS; Wound Packed w/ xeroform/ ABD x2 / DSD / Kerlix / Ace bandage  b) Weight Bearing Status; WBAT w/ Heel Touch w/ Surgical Shoe;   c) Continue w/ Local Wound Care; Q24 Dressing Changes;  d) Surgery scheduled for Monday 10/24/22 @ 11:00am excisional debridement of soft tissue and bone of left foot;       2. ESRD on HD  - HD as per nephrology     3. CAD s/p stent and CABG   - on statin   - Discuss with cardio. we're unable to hold aspirin. Cont aspirin   - holding plavix since 10/16  - continue BB  - Cardio consult appreciated. at moderate risk for surgery     4. DM II  - hypoglycemic this am  - Lantus  15 units HS     5.constipation   - Cont miralax daily       Case Discussed with House Staff   20 minutes spent on total encounter; more than 50% of the visit was spent counseling and/or coordinating care by the attending physician.   [employer] [phone]     SCHWABACHER, LAWRENCE  63y  [facility] F3-4B 014 B      Patient is a 63y old  Male who presents with a chief complaint of Sepsis (19 Oct 2022 14:02)      INTERVAL HPI/OVERNIGHT EVENTS:  Patient feels well. understand the plan of care  Still constipated despite laxative   no other overnight events             FAMILY HISTORY:  Family history of heart disease (Father)    DM (diabetes mellitus) (Mother)    ESRD (end stage renal disease) on dialysis (Mother)      T(C): 35.8 (10-19-22 @ 12:58), Max: 36.3 (10-18-22 @ 21:14)  HR: 78 (10-19-22 @ 12:58) (69 - 79)  BP: 134/60 (10-19-22 @ 12:58) (134/60 - 167/86)  RR: 18 (10-19-22 @ 12:58) (18 - 18)  SpO2: 99% (10-19-22 @ 12:58) (99% - 99%)  Wt(kg): --Vital Signs Last 24 Hrs  T(C): 35.8 (19 Oct 2022 12:58), Max: 36.3 (18 Oct 2022 21:14)  T(F): 96.5 (19 Oct 2022 12:58), Max: 97.3 (18 Oct 2022 21:14)  HR: 78 (19 Oct 2022 12:58) (69 - 79)  BP: 134/60 (19 Oct 2022 12:58) (134/60 - 167/86)  BP(mean): --  RR: 18 (19 Oct 2022 12:58) (18 - 18)  SpO2: 99% (19 Oct 2022 12:58) (99% - 99%)    Parameters below as of 19 Oct 2022 12:58  Patient On (Oxygen Delivery Method): room air        PHYSICAL EXAM:  GENERAL: NAD, well-groomed, well-developed  PULM: Clear to auscultation bilaterally  CARDIAC: Regular rate and rhythm;  GI: Soft, Nontender, Nondistended; Bowel sounds present  EXTREMITIES:  left foot swollen and wrapped     Consultant(s) Notes Reviewed:  [x ] YES  [ ] NO  Care Discussed with Consultants/Other Providers [ x] YES  [ ] NO    LABS:                            7.4    11.48 )-----------( 389      ( 19 Oct 2022 09:20 )             22.7   10-19    133<L>  |  97<L>  |  53<H>  ----------------------------<  91  4.9   |  25  |  5.7<HH>    Ca    8.0<L>      19 Oct 2022 09:20  Phos  3.0     10-18  Mg     2.0     10-19    TPro  6.2  /  Alb  2.3<L>  /  TBili  0.2  /  DBili  x   /  AST  12  /  ALT  6   /  AlkPhos  143<H>  10-19            Culture - Abscess with Gram Stain (collected 16 Oct 2022 17:50)  Source: .Abscess left foot wound  Preliminary Report (18 Oct 2022 19:12):    Rare Morganella morganii    Rare Enterobacter cloacae complex    Rare Proteus vulgaris group    Rare Enterococcus faecium (vancomycin resistant)  Organism: Morganella morganii  Enterobacter cloacae complex  Proteus vulgaris group  Enterococcus faecium (vancomycin resistant) (18 Oct 2022 19:11)  Organism: Enterococcus faecium (vancomycin resistant) (18 Oct 2022 19:11)  Organism: Proteus vulgaris group (18 Oct 2022 19:05)  Organism: Enterobacter cloacae complex (18 Oct 2022 19:05)  Organism: Morganella morganii (18 Oct 2022 19:05)      acetaminophen     Tablet .. 650 milliGRAM(s) Oral every 6 hours PRN  aluminum hydroxide/magnesium hydroxide/simethicone Suspension 30 milliLiter(s) Oral every 4 hours PRN  aspirin enteric coated 81 milliGRAM(s) Oral daily  atorvastatin 40 milliGRAM(s) Oral at bedtime  darbepoetin Injectable Syringe 25 MICROGram(s) IV Push <User Schedule>  dextrose 10% Bolus 125 milliLiter(s) IV Bolus once  dextrose 5%. 1000 milliLiter(s) IV Continuous <Continuous>  dextrose 5%. 1000 milliLiter(s) IV Continuous <Continuous>  dextrose 5%. 1000 milliLiter(s) IV Continuous <Continuous>  dextrose 50% Injectable 25 Gram(s) IV Push once  dextrose 50% Injectable 12.5 Gram(s) IV Push once  dextrose 50% Injectable 25 Gram(s) IV Push once  dextrose Oral Gel 15 Gram(s) Oral once PRN  glucagon  Injectable 1 milliGRAM(s) IntraMuscular once  influenza   Vaccine 0.5 milliLiter(s) IntraMuscular once  insulin glargine Injectable (LANTUS) 15 Unit(s) SubCutaneous at bedtime  insulin lispro (ADMELOG) corrective regimen sliding scale   SubCutaneous three times a day before meals  melatonin 3 milliGRAM(s) Oral at bedtime PRN  meropenem  IVPB 500 milliGRAM(s) IV Intermittent every 24 hours  metoprolol tartrate 50 milliGRAM(s) Oral two times a day  ondansetron Injectable 4 milliGRAM(s) IV Push every 8 hours PRN  sevelamer carbonate 800 milliGRAM(s) Oral three times a day    62 y/o with a PMHx of DM, HTN, ESRD (on dialysis M/W/F, last session on 10/14), CAD s/p 1 stent on 2008 and quadruple CABG on 2012, PAD s/p bilateral angioplasty at [facility], was transferred from [facility] complaining of weakness and unhealed wound in the left foot.    1. 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 hx of PAD   - Cont meropenem 500mg IV daily   - IR for angiogram:  a) Left lower extremity angiogram demonstrating chronically occluded PT and AT with patent anterior tibial vein bypass reconstituting into the DP.   b) No microvascular outflow demonstrates within the calcaneous. No hemodynamically significant stenosis within the left SFA and popliteal artery. No intervention performed  - Blood cx:NTD   - MRI L foot done:  a) Osteomyelitis and erosion of the calcaneus with a vertical pathologic fracture extending to the subtalar joint. Gangrene of overlying subcutaneous tissues. 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  - ID consult appreciated   - Podiatry consult:  a) Local Wound Care; Wound Flushed w/ NS; Wound Packed w/ xeroform/ ABD x2 / DSD / Kerlix / Ace bandage  b) Weight Bearing Status; WBAT w/ Heel Touch w/ Surgical Shoe;   c) Continue w/ Local Wound Care; Q24 Dressing Changes;  d) Surgery scheduled for Monday 10/24/22 @ 11:00am excisional debridement of soft tissue and bone of left foot;       2. ESRD on HD  - HD as per nephrology     3. CAD s/p stent and CABG   - on statin   - Discuss with cardio. we're unable to hold aspirin. Cont aspirin   - holding plavix since 10/16  - continue BB  - Cardio consult appreciated. at moderate risk for surgery     4. DM II  - hypoglycemic this am  - Lantus  15 units HS     5.constipation   - Cont miralax daily   - Milk of Mg*1       Case Discussed with House Staff   20 minutes spent on total encounter; more than 50% of the visit was spent counseling and/or coordinating care by the attending physician.   Spectra [phone]     SCHWABACHER, LAWRENCE  63y  [facility] F3-4B 014 B      Patient is a 63y old  Male who presents with a chief complaint of Sepsis (19 Oct 2022 14:02)      INTERVAL HPI/OVERNIGHT EVENTS:  Patient feels well. understand the plan of care  Still constipated despite laxative   no other overnight events             FAMILY HISTORY:  Family history of heart disease (Father)    DM (diabetes mellitus) (Mother)    ESRD (end stage renal disease) on dialysis (Mother)      T(C): 35.8 (10-19-22 @ 12:58), Max: 36.3 (10-18-22 @ 21:14)  HR: 78 (10-19-22 @ 12:58) (69 - 79)  BP: 134/60 (10-19-22 @ 12:58) (134/60 - 167/86)  RR: 18 (10-19-22 @ 12:58) (18 - 18)  SpO2: 99% (10-19-22 @ 12:58) (99% - 99%)  Wt(kg): --Vital Signs Last 24 Hrs  T(C): 35.8 (19 Oct 2022 12:58), Max: 36.3 (18 Oct 2022 21:14)  T(F): 96.5 (19 Oct 2022 12:58), Max: 97.3 (18 Oct 2022 21:14)  HR: 78 (19 Oct 2022 12:58) (69 - 79)  BP: 134/60 (19 Oct 2022 12:58) (134/60 - 167/86)  BP(mean): --  RR: 18 (19 Oct 2022 12:58) (18 - 18)  SpO2: 99% (19 Oct 2022 12:58) (99% - 99%)    Parameters below as of 19 Oct 2022 12:58  Patient On (Oxygen Delivery Method): room air        PHYSICAL EXAM:  GENERAL: NAD, well-groomed, well-developed  PULM: Clear to auscultation bilaterally  CARDIAC: Regular rate and rhythm;  GI: Soft, Nontender, Nondistended; Bowel sounds present  EXTREMITIES:  left foot swollen and wrapped     Consultant(s) Notes Reviewed:  [x ] YES  [ ] NO  Care Discussed with Consultants/Other Providers [ x] YES  [ ] NO    LABS:                            7.4    11.48 )-----------( 389      ( 19 Oct 2022 09:20 )             22.7   10-19    133<L>  |  97<L>  |  53<H>  ----------------------------<  91  4.9   |  25  |  5.7<HH>    Ca    8.0<L>      19 Oct 2022 09:20  Phos  3.0     10-18  Mg     2.0     10-19    TPro  6.2  /  Alb  2.3<L>  /  TBili  0.2  /  DBili  x   /  AST  12  /  ALT  6   /  AlkPhos  143<H>  10-19            Culture - Abscess with Gram Stain (collected 16 Oct 2022 17:50)  Source: .Abscess left foot wound  Preliminary Report (18 Oct 2022 19:12):    Rare Morganella morganii    Rare Enterobacter cloacae complex    Rare Proteus vulgaris group    Rare Enterococcus faecium (vancomycin resistant)  Organism: Morganella morganii  Enterobacter cloacae complex  Proteus vulgaris group  Enterococcus faecium (vancomycin resistant) (18 Oct 2022 19:11)  Organism: Enterococcus faecium (vancomycin resistant) (18 Oct 2022 19:11)  Organism: Proteus vulgaris group (18 Oct 2022 19:05)  Organism: Enterobacter cloacae complex (18 Oct 2022 19:05)  Organism: Morganella morganii (18 Oct 2022 19:05)      acetaminophen     Tablet .. 650 milliGRAM(s) Oral every 6 hours PRN  aluminum hydroxide/magnesium hydroxide/simethicone Suspension 30 milliLiter(s) Oral every 4 hours PRN  aspirin enteric coated 81 milliGRAM(s) Oral daily  atorvastatin 40 milliGRAM(s) Oral at bedtime  darbepoetin Injectable Syringe 25 MICROGram(s) IV Push <User Schedule>  dextrose 10% Bolus 125 milliLiter(s) IV Bolus once  dextrose 5%. 1000 milliLiter(s) IV Continuous <Continuous>  dextrose 5%. 1000 milliLiter(s) IV Continuous <Continuous>  dextrose 5%. 1000 milliLiter(s) IV Continuous <Continuous>  dextrose 50% Injectable 25 Gram(s) IV Push once  dextrose 50% Injectable 12.5 Gram(s) IV Push once  dextrose 50% Injectable 25 Gram(s) IV Push once  dextrose Oral Gel 15 Gram(s) Oral once PRN  glucagon  Injectable 1 milliGRAM(s) IntraMuscular once  influenza   Vaccine 0.5 milliLiter(s) IntraMuscular once  insulin glargine Injectable (LANTUS) 15 Unit(s) SubCutaneous at bedtime  insulin lispro (ADMELOG) corrective regimen sliding scale   SubCutaneous three times a day before meals  melatonin 3 milliGRAM(s) Oral at bedtime PRN  meropenem  IVPB 500 milliGRAM(s) IV Intermittent every 24 hours  metoprolol tartrate 50 milliGRAM(s) Oral two times a day  ondansetron Injectable 4 milliGRAM(s) IV Push every 8 hours PRN  sevelamer carbonate 800 milliGRAM(s) Oral three times a day    64 y/o with a PMHx of DM, HTN, ESRD (on dialysis M/W/F, last session on 10/14), CAD s/p 1 stent on 2008 and quadruple CABG on 2012, PAD s/p bilateral angioplasty at [facility], was transferred from [facility] complaining of weakness and unhealed wound in the left foot.    1. 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 hx of PAD   - Cont meropenem 500mg IV daily   - IR for angiogram:  a) Left lower extremity angiogram demonstrating chronically occluded PT and AT with patent anterior tibial vein bypass reconstituting into the DP.   b) No microvascular outflow demonstrates within the calcaneous. No hemodynamically significant stenosis within the left SFA and popliteal artery. No intervention performed  - Blood cx:NTD   - MRI L foot done:  a) Osteomyelitis and erosion of the calcaneus with a vertical pathologic fracture extending to the subtalar joint. Gangrene of overlying subcutaneous tissues. 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  - ID consult appreciated   - Podiatry consult:  a) Local Wound Care; Wound Flushed w/ NS; Wound Packed w/ xeroform/ ABD x2 / DSD / Kerlix / Ace bandage  b) Weight Bearing Status; WBAT w/ Heel Touch w/ Surgical Shoe;   c) Continue w/ Local Wound Care; Q24 Dressing Changes;  d) Surgery scheduled for Monday 10/24/22 @ 11:00am excisional debridement of soft tissue and bone of left foot;       2. ESRD on HD  - HD as per nephrology     3. CAD s/p stent and CABG   - on statin   - Discuss with cardio. we're unable to hold aspirin. Cont aspirin   - holding plavix since 10/16  - continue BB  - Cardio consult appreciated. at moderate risk for surgery     4. DM II  - hypoglycemic this am  - Lantus  15 units HS     5.constipation   - Cont miralax daily   - Milk of Mg*1     patient denies chest pain, sob. he's medically clear for surgery as benefits exceed the risks.     Case Discussed with House Staff   26 minutes spent on total encounter; more than 50% of the visit was spent counseling and/or coordinating care by the attending physician.   Spectra [phone]   3

## 2024-02-29 ENCOUNTER — APPOINTMENT (OUTPATIENT)
Dept: OBGYN | Facility: CLINIC | Age: 29
End: 2024-02-29

## 2024-02-29 RX ADMIN — Medication 600 MILLIGRAM(S): at 06:12

## 2024-02-29 RX ADMIN — Medication 975 MILLIGRAM(S): at 08:24

## 2024-02-29 RX ADMIN — Medication 600 MILLIGRAM(S): at 11:13

## 2024-02-29 RX ADMIN — Medication 600 MILLIGRAM(S): at 00:40

## 2024-02-29 RX ADMIN — Medication 600 MILLIGRAM(S): at 12:00

## 2024-02-29 RX ADMIN — Medication 975 MILLIGRAM(S): at 09:00

## 2024-02-29 RX ADMIN — Medication 600 MILLIGRAM(S): at 23:57

## 2024-02-29 RX ADMIN — Medication 600 MILLIGRAM(S): at 18:00

## 2024-02-29 RX ADMIN — Medication 1 TABLET(S): at 11:13

## 2024-02-29 RX ADMIN — Medication 975 MILLIGRAM(S): at 20:42

## 2024-02-29 RX ADMIN — Medication 975 MILLIGRAM(S): at 03:01

## 2024-02-29 RX ADMIN — Medication 975 MILLIGRAM(S): at 02:31

## 2024-02-29 RX ADMIN — Medication 975 MILLIGRAM(S): at 21:12

## 2024-02-29 RX ADMIN — Medication 600 MILLIGRAM(S): at 00:10

## 2024-02-29 RX ADMIN — Medication 600 MILLIGRAM(S): at 05:42

## 2024-02-29 RX ADMIN — Medication 975 MILLIGRAM(S): at 14:25

## 2024-02-29 RX ADMIN — Medication 600 MILLIGRAM(S): at 17:14

## 2024-02-29 RX ADMIN — Medication 975 MILLIGRAM(S): at 15:00

## 2024-02-29 NOTE — PROGRESS NOTE ADULT - ASSESSMENT
A/P: 29yo PPD#1 s/p .  Patient is stable and doing well post-partum.   - Pain well controlled, continue current pain regimen  - Increase ambulation, SCDs when not ambulating  - Continue regular diet    Calista Candelaria PGY1

## 2024-03-01 ENCOUNTER — TRANSCRIPTION ENCOUNTER (OUTPATIENT)
Age: 29
End: 2024-03-01

## 2024-03-01 VITALS
HEART RATE: 88 BPM | OXYGEN SATURATION: 100 % | RESPIRATION RATE: 18 BRPM | DIASTOLIC BLOOD PRESSURE: 80 MMHG | SYSTOLIC BLOOD PRESSURE: 126 MMHG | TEMPERATURE: 98 F

## 2024-03-01 PROCEDURE — 86850 RBC ANTIBODY SCREEN: CPT

## 2024-03-01 PROCEDURE — 59050 FETAL MONITOR W/REPORT: CPT

## 2024-03-01 PROCEDURE — 86900 BLOOD TYPING SEROLOGIC ABO: CPT

## 2024-03-01 PROCEDURE — 86780 TREPONEMA PALLIDUM: CPT

## 2024-03-01 PROCEDURE — 36415 COLL VENOUS BLD VENIPUNCTURE: CPT

## 2024-03-01 PROCEDURE — 85025 COMPLETE CBC W/AUTO DIFF WBC: CPT

## 2024-03-01 PROCEDURE — 86901 BLOOD TYPING SEROLOGIC RH(D): CPT

## 2024-03-01 RX ADMIN — Medication 600 MILLIGRAM(S): at 06:17

## 2024-03-01 RX ADMIN — Medication 975 MILLIGRAM(S): at 09:11

## 2024-03-01 RX ADMIN — Medication 975 MILLIGRAM(S): at 09:45

## 2024-03-01 RX ADMIN — Medication 600 MILLIGRAM(S): at 00:30

## 2024-03-01 RX ADMIN — Medication 975 MILLIGRAM(S): at 03:00

## 2024-03-01 RX ADMIN — Medication 975 MILLIGRAM(S): at 02:23

## 2024-03-01 RX ADMIN — Medication 600 MILLIGRAM(S): at 05:47

## 2024-03-01 NOTE — DISCHARGE NOTE OB - CARE PROVIDER_API CALL
Pranav Barnes  Obstetrics and Gynecology  [address] [place] [address], [address]  [place] [address]  Phone: [phone]  Fax: [phone]  Follow Up Time:

## 2024-03-01 NOTE — PROGRESS NOTE ADULT - ATTENDING COMMENTS
I agree with the resident's note above.    Patient is doing well. Pain is well controlled. Tolerating regular diet, ambulating, and voiding.  Vital signs stable      Plan:  Reg diet  Ambulating  Pain control  Routine postpartum care    gchow
agree with above note  stable for discharge  tana bolden md

## 2024-03-01 NOTE — DISCHARGE NOTE OB - MATERIALS PROVIDED
[facility] [place] Screening Program/Breastfeeding Log/Bottle Feeding Log/Breastfeeding Mother’s Support Group Information/Guide to Postpartum Care/[facility] Hearing Screen Program/Back To Sleep Handout/Shaken Baby Prevention Handout/Breastfeeding Guide and Packet/Birth Certificate Instructions/Discharge Medication Information for Patients and Families Pocket Guide

## 2024-03-01 NOTE — PROGRESS NOTE ADULT - ASSESSMENT
A/P: 29yo PPD#2 s/p .  Patient is stable and doing well post-partum.   - Pain well controlled, continue current pain regimen  - Increase ambulation, SCDs when not ambulating  - Continue regular diet    Calista Candelaria PGY1

## 2024-03-01 NOTE — DISCHARGE NOTE OB - PATIENT PORTAL LINK FT
You can access the FollowMyHealth Patient Portal offered by [facility] by registering at the following website: [email or web address]. By joining [facility]’s FollowMyHealth portal, you will also be able to view your health information using other applications (apps) compatible with our system.

## 2024-03-01 NOTE — PROGRESS NOTE ADULT - SUBJECTIVE AND OBJECTIVE BOX
OB Progress Note:  PPD#2    S: 27yo  PPD#2 s/p . Patient feels well. Pain is well controlled. She is tolerating a regular diet and passing flatus. She is voiding spontaneously, and ambulating without difficulty. Denies CP/SOB. Denies lightheadedness/dizziness. Denies N/V.    O:  Vitals:  Vital Signs Last 24 Hrs  T(C): 36.8 (01 Mar 2024 05:12), Max: 36.8 (2024 19:00)  T(F): 98.2 (01 Mar 2024 05:12), Max: 98.2 (2024 19:00)  HR: 88 (01 Mar 2024 05:12) (76 - 88)  BP: 126/80 (01 Mar 2024 05:12) (110/70 - 126/80)  BP(mean): --  RR: 18 (01 Mar 2024 05:12) (18 - 18)  SpO2: 100% (01 Mar 2024 05:12) (98% - 100%)    Parameters below as of 01 Mar 2024 05:12  Patient On (Oxygen Delivery Method): room air        MEDICATIONS  (STANDING):  acetaminophen     Tablet .. 975 milliGRAM(s) Oral <User Schedule>  diphtheria/tetanus/pertussis (acellular) Vaccine (Adacel) 0.5 milliLiter(s) IntraMuscular once  ibuprofen  Tablet. 600 milliGRAM(s) Oral every 6 hours  oxytocin Infusion 333.333 milliUNIT(s)/Min (1000 mL/Hr) IV Continuous <Continuous>  oxytocin Infusion 41.667 milliUNIT(s)/Min (125 mL/Hr) IV Continuous <Continuous>  oxytocin Infusion. 4 milliUNIT(s)/Min (4 mL/Hr) IV Continuous <Continuous>  prenatal multivitamin 1 Tablet(s) Oral daily      Labs:  Blood type: O Positive  Rubella IgG: RPR: Negative                          12.7   9.93 >-----------< 163    (  @ 21:27 )             38.1                  Physical Exam:  General: NAD  Abdomen: soft, non-tender, non-distended, fundus firm  Vaginal: Lochia wnl  Extremities: No erythema/edema    
OB Progress Note:  PPD#1    S: 27yo  PPD#1 s/p . Patient feels well. Pain is well controlled. She is tolerating a regular diet and passing flatus. She is voiding spontaneously, and ambulating without difficulty. Denies CP/SOB. Denies lightheadedness/dizziness. Denies N/V.    O:  Vitals:  Vital Signs Last 24 Hrs  T(C): 36.7 (2024 17:02), Max: 36.9 (2024 07:17)  T(F): 98.1 (2024 17:02), Max: 98.4 (2024 07:17)  HR: 77 (2024 17:02) (61 - 82)  BP: 117/83 (2024 17:02) (97/54 - 117/83)  BP(mean): --  RR: 18 (2024 17:02) (17 - 18)  SpO2: 99% (2024 17:02) (97% - 100%)    Parameters below as of 2024 17:02  Patient On (Oxygen Delivery Method): room air        MEDICATIONS  (STANDING):  acetaminophen     Tablet .. 975 milliGRAM(s) Oral <User Schedule>  diphtheria/tetanus/pertussis (acellular) Vaccine (Adacel) 0.5 milliLiter(s) IntraMuscular once  ibuprofen  Tablet. 600 milliGRAM(s) Oral every 6 hours  oxytocin Infusion 333.333 milliUNIT(s)/Min (1000 mL/Hr) IV Continuous <Continuous>  oxytocin Infusion 41.667 milliUNIT(s)/Min (125 mL/Hr) IV Continuous <Continuous>  oxytocin Infusion. 4 milliUNIT(s)/Min (4 mL/Hr) IV Continuous <Continuous>  prenatal multivitamin 1 Tablet(s) Oral daily  sodium chloride 0.9% lock flush 3 milliLiter(s) IV Push every 8 hours      Labs:  Blood type: O Positive  Rubella IgG: RPR: Negative                          12.7   9.93 >-----------< 163    ( 02-27 @ 21:27 )             38.1                  Physical Exam:  General: NAD  Abdomen: soft, non-tender, non-distended, fundus firm  Vaginal: Lochia wnl  Extremities: No erythema/edema

## 2024-04-11 ENCOUNTER — APPOINTMENT (OUTPATIENT)
Dept: OBGYN | Facility: CLINIC | Age: 29
End: 2024-04-11
Payer: COMMERCIAL

## 2024-04-11 PROCEDURE — 0503F POSTPARTUM CARE VISIT: CPT

## 2025-05-22 ENCOUNTER — APPOINTMENT (OUTPATIENT)
Dept: OBGYN | Facility: CLINIC | Age: 30
End: 2025-05-22
Payer: COMMERCIAL

## 2025-05-22 PROCEDURE — 96127 BRIEF EMOTIONAL/BEHAV ASSMT: CPT

## 2025-05-22 PROCEDURE — 99395 PREV VISIT EST AGE 18-39: CPT

## 2025-05-22 PROCEDURE — 99459 PELVIC EXAMINATION: CPT

## 2025-06-12 ENCOUNTER — APPOINTMENT (OUTPATIENT)
Dept: OBGYN | Facility: CLINIC | Age: 30
End: 2025-06-12
Payer: COMMERCIAL

## 2025-06-12 PROCEDURE — 57454 BX/CURETT OF CERVIX W/SCOPE: CPT

## 2025-06-12 PROCEDURE — 99212 OFFICE O/P EST SF 10 MIN: CPT | Mod: 25
